# Patient Record
Sex: MALE | Race: WHITE | Employment: FULL TIME | URBAN - METROPOLITAN AREA
[De-identification: names, ages, dates, MRNs, and addresses within clinical notes are randomized per-mention and may not be internally consistent; named-entity substitution may affect disease eponyms.]

---

## 2021-08-26 ENCOUNTER — APPOINTMENT (OUTPATIENT)
Dept: CT IMAGING | Facility: CLINIC | Age: 66
DRG: 897 | End: 2021-08-26
Payer: COMMERCIAL

## 2021-08-26 ENCOUNTER — HOSPITAL ENCOUNTER (INPATIENT)
Age: 66
LOS: 5 days | Discharge: HOME | DRG: 897 | End: 2021-08-31
Attending: EMERGENCY MEDICINE | Admitting: INTERNAL MEDICINE
Payer: COMMERCIAL

## 2021-08-26 DIAGNOSIS — I10 ESSENTIAL HYPERTENSION: ICD-10-CM

## 2021-08-26 DIAGNOSIS — F10.10 ALCOHOL ABUSE: ICD-10-CM

## 2021-08-26 DIAGNOSIS — R55 VASOVAGAL RESPONSE: ICD-10-CM

## 2021-08-26 DIAGNOSIS — S09.90XA CLOSED HEAD INJURY, INITIAL ENCOUNTER: Primary | ICD-10-CM

## 2021-08-26 DIAGNOSIS — R56.9 SEIZURE (HCC): ICD-10-CM

## 2021-08-26 DIAGNOSIS — R56.9 SEIZURES (HCC): ICD-10-CM

## 2021-08-26 PROBLEM — Z72.0 TOBACCO ABUSE: Status: ACTIVE | Noted: 2021-08-26

## 2021-08-26 LAB
-: ABNORMAL
ABSOLUTE EOS #: 0.1 K/UL (ref 0–0.4)
ABSOLUTE IMMATURE GRANULOCYTE: ABNORMAL K/UL (ref 0–0.3)
ABSOLUTE LYMPH #: 1.5 K/UL (ref 1–4.8)
ABSOLUTE MONO #: 0.7 K/UL (ref 0.1–1.2)
ALBUMIN SERPL-MCNC: 3.9 G/DL (ref 3.5–5.2)
ALBUMIN/GLOBULIN RATIO: 1.6 (ref 1–2.5)
ALP BLD-CCNC: 87 U/L (ref 40–129)
ALT SERPL-CCNC: 29 U/L (ref 5–41)
AMORPHOUS: ABNORMAL
AMPHETAMINE SCREEN URINE: NEGATIVE
ANION GAP SERPL CALCULATED.3IONS-SCNC: 18 MMOL/L (ref 9–17)
AST SERPL-CCNC: 62 U/L
BACTERIA: ABNORMAL
BARBITURATE SCREEN URINE: NEGATIVE
BASOPHILS # BLD: 1 % (ref 0–2)
BASOPHILS ABSOLUTE: 0.1 K/UL (ref 0–0.2)
BENZODIAZEPINE SCREEN, URINE: NEGATIVE
BILIRUB SERPL-MCNC: 0.6 MG/DL (ref 0.3–1.2)
BILIRUBIN URINE: NEGATIVE
BUN BLDV-MCNC: 10 MG/DL (ref 8–23)
BUN/CREAT BLD: ABNORMAL (ref 9–20)
BUPRENORPHINE URINE: NORMAL
CALCIUM SERPL-MCNC: 8.8 MG/DL (ref 8.6–10.4)
CANNABINOID SCREEN URINE: NEGATIVE
CASTS UA: ABNORMAL /LPF (ref 0–2)
CHLORIDE BLD-SCNC: 100 MMOL/L (ref 98–107)
CO2: 19 MMOL/L (ref 20–31)
COCAINE METABOLITE, URINE: NEGATIVE
COLOR: YELLOW
COMMENT UA: ABNORMAL
CREAT SERPL-MCNC: 0.8 MG/DL (ref 0.7–1.2)
CRYSTALS, UA: ABNORMAL /HPF
DIFFERENTIAL TYPE: ABNORMAL
EOSINOPHILS RELATIVE PERCENT: 2 % (ref 1–4)
EPITHELIAL CELLS UA: ABNORMAL /HPF (ref 0–5)
ETHANOL PERCENT: <0.01 %
ETHANOL: <10 MG/DL
GFR AFRICAN AMERICAN: >60 ML/MIN
GFR NON-AFRICAN AMERICAN: >60 ML/MIN
GFR SERPL CREATININE-BSD FRML MDRD: ABNORMAL ML/MIN/{1.73_M2}
GFR SERPL CREATININE-BSD FRML MDRD: ABNORMAL ML/MIN/{1.73_M2}
GLUCOSE BLD-MCNC: 126 MG/DL (ref 70–99)
GLUCOSE BLD-MCNC: 134 MG/DL (ref 75–110)
GLUCOSE URINE: NEGATIVE
HCT VFR BLD CALC: 42.3 % (ref 41–53)
HEMOGLOBIN: 14.5 G/DL (ref 13.5–17.5)
IMMATURE GRANULOCYTES: ABNORMAL %
KETONES, URINE: ABNORMAL
LEUKOCYTE ESTERASE, URINE: NEGATIVE
LYMPHOCYTES # BLD: 17 % (ref 24–44)
MAGNESIUM: 1.6 MG/DL (ref 1.6–2.6)
MCH RBC QN AUTO: 34.1 PG (ref 26–34)
MCHC RBC AUTO-ENTMCNC: 34.4 G/DL (ref 31–37)
MCV RBC AUTO: 99.1 FL (ref 80–100)
MDMA URINE: NORMAL
METHADONE SCREEN, URINE: NEGATIVE
METHAMPHETAMINE, URINE: NORMAL
MONOCYTES # BLD: 8 % (ref 2–11)
MUCUS: ABNORMAL
NITRITE, URINE: NEGATIVE
NRBC AUTOMATED: ABNORMAL PER 100 WBC
OPIATES, URINE: NEGATIVE
OTHER OBSERVATIONS UA: ABNORMAL
OXYCODONE SCREEN URINE: NEGATIVE
PDW BLD-RTO: 14.5 % (ref 12.5–15.4)
PH UA: 7 (ref 5–8)
PHENCYCLIDINE, URINE: NEGATIVE
PLATELET # BLD: 223 K/UL (ref 140–450)
PLATELET ESTIMATE: ABNORMAL
PMV BLD AUTO: 9.1 FL (ref 6–12)
POTASSIUM SERPL-SCNC: 3.7 MMOL/L (ref 3.7–5.3)
PROPOXYPHENE, URINE: NORMAL
PROTEIN UA: NEGATIVE
RBC # BLD: 4.27 M/UL (ref 4.5–5.9)
RBC # BLD: ABNORMAL 10*6/UL
RBC UA: ABNORMAL /HPF (ref 0–2)
RENAL EPITHELIAL, UA: ABNORMAL /HPF
SEG NEUTROPHILS: 72 % (ref 36–66)
SEGMENTED NEUTROPHILS ABSOLUTE COUNT: 6.7 K/UL (ref 1.8–7.7)
SODIUM BLD-SCNC: 137 MMOL/L (ref 135–144)
SPECIFIC GRAVITY UA: 1.02 (ref 1–1.03)
TEST INFORMATION: NORMAL
TOTAL PROTEIN: 6.4 G/DL (ref 6.4–8.3)
TRICHOMONAS: ABNORMAL
TRICYCLIC ANTIDEPRESSANTS, UR: NORMAL
TROPONIN INTERP: NORMAL
TROPONIN INTERP: NORMAL
TROPONIN T: NORMAL NG/ML
TROPONIN T: NORMAL NG/ML
TROPONIN, HIGH SENSITIVITY: <6 NG/L (ref 0–22)
TROPONIN, HIGH SENSITIVITY: <6 NG/L (ref 0–22)
TURBIDITY: CLEAR
URINE HGB: ABNORMAL
UROBILINOGEN, URINE: NORMAL
WBC # BLD: 9.2 K/UL (ref 3.5–11)
WBC # BLD: ABNORMAL 10*3/UL
WBC UA: ABNORMAL /HPF (ref 0–5)
YEAST: ABNORMAL

## 2021-08-26 PROCEDURE — 80307 DRUG TEST PRSMV CHEM ANLYZR: CPT

## 2021-08-26 PROCEDURE — 2580000003 HC RX 258: Performed by: EMERGENCY MEDICINE

## 2021-08-26 PROCEDURE — 84484 ASSAY OF TROPONIN QUANT: CPT

## 2021-08-26 PROCEDURE — 6370000000 HC RX 637 (ALT 250 FOR IP): Performed by: NURSE PRACTITIONER

## 2021-08-26 PROCEDURE — 85025 COMPLETE CBC W/AUTO DIFF WBC: CPT

## 2021-08-26 PROCEDURE — 93005 ELECTROCARDIOGRAM TRACING: CPT | Performed by: EMERGENCY MEDICINE

## 2021-08-26 PROCEDURE — 6370000000 HC RX 637 (ALT 250 FOR IP): Performed by: EMERGENCY MEDICINE

## 2021-08-26 PROCEDURE — 81001 URINALYSIS AUTO W/SCOPE: CPT

## 2021-08-26 PROCEDURE — 99285 EMERGENCY DEPT VISIT HI MDM: CPT

## 2021-08-26 PROCEDURE — 99222 1ST HOSP IP/OBS MODERATE 55: CPT | Performed by: INTERNAL MEDICINE

## 2021-08-26 PROCEDURE — 80053 COMPREHEN METABOLIC PANEL: CPT

## 2021-08-26 PROCEDURE — 70450 CT HEAD/BRAIN W/O DYE: CPT

## 2021-08-26 PROCEDURE — 82947 ASSAY GLUCOSE BLOOD QUANT: CPT

## 2021-08-26 PROCEDURE — 6360000002 HC RX W HCPCS: Performed by: EMERGENCY MEDICINE

## 2021-08-26 PROCEDURE — 36415 COLL VENOUS BLD VENIPUNCTURE: CPT

## 2021-08-26 PROCEDURE — 96374 THER/PROPH/DIAG INJ IV PUSH: CPT

## 2021-08-26 PROCEDURE — 83735 ASSAY OF MAGNESIUM: CPT

## 2021-08-26 PROCEDURE — G0480 DRUG TEST DEF 1-7 CLASSES: HCPCS

## 2021-08-26 PROCEDURE — 72125 CT NECK SPINE W/O DYE: CPT

## 2021-08-26 PROCEDURE — 6360000002 HC RX W HCPCS

## 2021-08-26 PROCEDURE — 2060000000 HC ICU INTERMEDIATE R&B

## 2021-08-26 RX ORDER — LORAZEPAM 2 MG/ML
1 INJECTION INTRAMUSCULAR ONCE
Status: COMPLETED | OUTPATIENT
Start: 2021-08-26 | End: 2021-08-26

## 2021-08-26 RX ORDER — FOLIC ACID 1 MG/1
1 TABLET ORAL DAILY
Status: DISCONTINUED | OUTPATIENT
Start: 2021-08-26 | End: 2021-08-31 | Stop reason: HOSPADM

## 2021-08-26 RX ORDER — DIAZEPAM 5 MG/1
5 TABLET ORAL ONCE
Status: COMPLETED | OUTPATIENT
Start: 2021-08-26 | End: 2021-08-26

## 2021-08-26 RX ORDER — BUPROPION HYDROCHLORIDE 300 MG/1
300 TABLET ORAL EVERY MORNING
Status: ON HOLD | COMMUNITY
End: 2021-08-27

## 2021-08-26 RX ORDER — LORAZEPAM 2 MG/ML
2 INJECTION INTRAMUSCULAR EVERY 4 HOURS PRN
Status: DISCONTINUED | OUTPATIENT
Start: 2021-08-26 | End: 2021-08-27

## 2021-08-26 RX ORDER — ACETAMINOPHEN 325 MG/1
650 TABLET ORAL EVERY 4 HOURS PRN
Status: DISCONTINUED | OUTPATIENT
Start: 2021-08-26 | End: 2021-08-31 | Stop reason: HOSPADM

## 2021-08-26 RX ORDER — TAMSULOSIN HYDROCHLORIDE 0.4 MG/1
0.4 CAPSULE ORAL DAILY
Status: DISCONTINUED | OUTPATIENT
Start: 2021-08-27 | End: 2021-08-31 | Stop reason: HOSPADM

## 2021-08-26 RX ORDER — CARVEDILOL 3.12 MG/1
3.12 TABLET ORAL 2 TIMES DAILY WITH MEALS
Status: ON HOLD | COMMUNITY
End: 2021-08-31 | Stop reason: SDUPTHER

## 2021-08-26 RX ORDER — LORAZEPAM 2 MG/ML
INJECTION INTRAMUSCULAR
Status: COMPLETED
Start: 2021-08-26 | End: 2021-08-26

## 2021-08-26 RX ORDER — TAMSULOSIN HYDROCHLORIDE 0.4 MG/1
0.4 CAPSULE ORAL DAILY
COMMUNITY

## 2021-08-26 RX ORDER — 0.9 % SODIUM CHLORIDE 0.9 %
1000 INTRAVENOUS SOLUTION INTRAVENOUS ONCE
Status: COMPLETED | OUTPATIENT
Start: 2021-08-26 | End: 2021-08-26

## 2021-08-26 RX ORDER — GAUZE BANDAGE 2" X 2"
100 BANDAGE TOPICAL DAILY
Status: DISCONTINUED | OUTPATIENT
Start: 2021-08-26 | End: 2021-08-31 | Stop reason: HOSPADM

## 2021-08-26 RX ORDER — AMLODIPINE BESYLATE 10 MG/1
10 TABLET ORAL DAILY
Status: ON HOLD | COMMUNITY
End: 2021-08-31 | Stop reason: SDUPTHER

## 2021-08-26 RX ORDER — HYDROCHLOROTHIAZIDE 25 MG/1
25 TABLET ORAL DAILY
Status: ON HOLD | COMMUNITY
End: 2021-08-31 | Stop reason: SDUPTHER

## 2021-08-26 RX ADMIN — LORAZEPAM 1 MG: 2 INJECTION INTRAMUSCULAR; INTRAVENOUS at 15:57

## 2021-08-26 RX ADMIN — LORAZEPAM 1 MG: 2 INJECTION INTRAMUSCULAR at 12:08

## 2021-08-26 RX ADMIN — FOLIC ACID 1 MG: 1 TABLET ORAL at 21:18

## 2021-08-26 RX ADMIN — ACETAMINOPHEN 650 MG: 325 TABLET ORAL at 21:18

## 2021-08-26 RX ADMIN — LORAZEPAM 1 MG: 2 INJECTION INTRAMUSCULAR; INTRAVENOUS at 12:08

## 2021-08-26 RX ADMIN — Medication 100 MG: at 21:18

## 2021-08-26 RX ADMIN — SODIUM CHLORIDE 1000 ML: 9 INJECTION, SOLUTION INTRAVENOUS at 09:55

## 2021-08-26 RX ADMIN — DIAZEPAM 5 MG: 5 TABLET ORAL at 17:45

## 2021-08-26 ASSESSMENT — ENCOUNTER SYMPTOMS
VOMITING: 0
SHORTNESS OF BREATH: 0
SORE THROAT: 0
DIARRHEA: 0

## 2021-08-26 ASSESSMENT — PAIN SCALES - GENERAL
PAINLEVEL_OUTOF10: 3
PAINLEVEL_OUTOF10: 5

## 2021-08-26 NOTE — ED TRIAGE NOTES
Pt presents to ER via LS 5 with fall/ seizure. Per wife at bedside, states co-worker/ heard a \"thud\" and came outside, pt was on the ground seizing. Bystander did not witness the fall. Pt was post-ictal upon EMS arrival . Blood sugar -97 EKG on scene LBBB. PER LS 5 REPORT. PT C SPINE cleared upon EMS arrival. Pt answers questions, pt bit his tongue and has an abrasion to back of head. Pt exhibits tremors. Pt admits to drinking last night ETOH.

## 2021-08-26 NOTE — ED NOTES
[facility] CALLED- NO AVAILABLE TRANSPORT UNTIL TOMORROW.  [address], MILLA  08/26/21 0699

## 2021-08-26 NOTE — PROGRESS NOTES
Admitted from ER to room 1009-2. Pt alert and oriented. Pt oriented to call light system and agreeable to call for assistance. Family at bed side. Admission documentation completed    Pt is current smoker.

## 2021-08-26 NOTE — ED NOTES
[facility] board.  Calling [facility] to check status     Anushka Gomez RN  08/26/21 4434

## 2021-08-26 NOTE — LETTER
[facility]  [address] [id number]  Phone: [phone]             August 31, 2021    Patient: Hakeem Elizalde   Date of Birth:    Date of Visit: 8/26/2021       To Whom It May Concern:    Artie Aguilar was seen and treated in our facility beginning 8/26/2021 until 8/31/2021. Per Dr. Santos Ours Blood he may not return to work until he follows up with his PCP due to the nature of his treatment.  If you have any questions or concerns please feel free to talk to the charge nurse of the unit [phone]      Sincerely,       Víctor San RN-BSN         Signature:__________________________________

## 2021-08-26 NOTE — ED NOTES
Pt assisted to bathroom via wheelchair / extremity tremors more profound per wife.       Sloan Greenfield RN  08/26/21 6014

## 2021-08-26 NOTE — ED NOTES
This RN to bedside to discuss discharge instructions with patient and patient significant other. Patient then had an approximately 60 second tonic-clonic seizure. Pt was sitting on edge of bed, no fall occurred. Pt remained on ED cot for event. Pt was turned on his side, Dr. Nir Knight and Kings Kelley EMT-P to bedside to assist.   Verbal order obtained at this time from Dr. Nir Knight for IV Ativan. Airway remained patent, pt did bite his tongue but no respiratory distress noted. Pt came to, is post-ictal, amnesic to events. Dr. Nir Knight and pt significant other agree to admit patient for further evaluation.        Aniceto Ken, MILLA  08/26/21 5895 Mary Bernard, RN  08/26/21 0748

## 2021-08-26 NOTE — ED NOTES
[facility] DISPATCHER ETA [address].       Kaylan Rivera RN  08/26/21 8177 [facility], RN  08/26/21 2007

## 2021-08-26 NOTE — H&P
[facility]  Office: [address], DO, Stacy Harrison, DO, Qasim Tim, DO, Inna Paty Gama, DO, Surya Montgomery MD, Sade Negrete MD, Leslie Quiroga MD, Anne Tucker MD, Beryle Marseille, MD, Ciro Pallas, MD, Jose D Noriega MD, [facility], DO, Diane Hunt MD, Nelsy Barber, DO, Joey Carranza MD,  Alex Young DO, Jordana Patton MD, Apolonia Lorenzo MD, Jie Roman MD, Mahsa Benson MD, Chris Hall MD, Vika Cosby MD, Angeles Merrill MD, Zamzam Lacey, [facility], [address], [facility], Mao Carlin, CNP, Nickolas Berry, CNS, Hardik Xavier, CNP, Betsy Simon, CNP, Shirin Nagel, CNP, Dane Torrez, CNP, Osman Hart, CNP, NADJA Tan-C, Mortimer Guardian, [facility], Favio Samuels, CNP, Lavonne Frankel, CNP, Christopher Marks, CNP, Shelli Casarez, CNP, Live Schroeder, CNP, Prince Aponte, CNP, Gabi Haley, CNP, Keny Raygoza, [facility]    HISTORY AND PHYSICAL EXAMINATION            Date:   8/26/2021  Patient name:  Jennifer Castaneda  Date of admission:  8/26/2021  9:37 AM  MRN:   [id number]  Account:  [de-identified]  YOB: 1955  PCP:    Liudmila Portillo MD  Room:   [facility]9/1009-02  Code Status:    No Order    Chief Complaint:     Chief Complaint   Patient presents with    Seizures     witnessed fall onto the grass via co-workers. Pt fell and struck back of head- abrasion to scalp. Pt LS 5  cleared c spine on scene pt denies neck pain        History Obtained From:     patient, spouse, electronic medical record    History of Present Illness:     Jennifer Castaneda is a 63 y.o.  male who presents with Seizures (witnessed fall onto the grass via co-workers. Pt fell and struck back of head- abrasion to scalp. Pt LS 5  cleared c spine on scene pt denies neck pain )   and is admitted to the hospital for the management of New onset seizure ([facility] [address] This 63 yom presents after having a seizure.   He was at work at time, was heard to have grunted, then fall, tried to get up and fell again. He had no aura and has never had this before. In ER, he was seen to have an approx 1 minute seizure witnessed by staff. His last alcoholic drink was yesterday, per wife in last 6 weeks he went from drinking on weekends to daily 1-2 pints of vodka/day    Past Medical History:     Past Medical History:   Diagnosis Date    Hypertension     Prostate cancer ([facility] [address]     PER WIFE- SUSPECTED     Smoker         Past Surgical History:     Past Surgical History:   Procedure Laterality Date    JOINT REPLACEMENT      RIGHT HIP REPLACEMENT     SHOULDER ARTHROSCOPY          Medications Prior to Admission:     Prior to Admission medications    Medication Sig Start Date End Date Taking? Authorizing Provider   tamsulosin (FLOMAX) 0.4 MG capsule Take 0.4 mg by mouth daily   Yes Historical Provider, MD   buPROPion (WELLBUTRIN XL) 300 MG extended release tablet Take 300 mg by mouth every morning   Yes Historical Provider, MD   amLODIPine (NORVASC) 10 MG tablet Take 10 mg by mouth daily   Yes Historical Provider, MD   carvedilol (COREG) 3.125 MG tablet Take 3.125 mg by mouth 2 times daily (with meals)   Yes Historical Provider, MD   hydroCHLOROthiazide (HYDRODIURIL) 25 MG tablet Take 25 mg by mouth daily   Yes Historical Provider, MD        Allergies:     Dilaudid [hydromorphone hcl], Morphine, Singulair [montelukast], and Sulfa antibiotics    Social History:     Tobacco:    reports that he has been smoking. He has been smoking about 1.00 pack per day. He has never used smokeless tobacco.  Alcohol:      reports current alcohol use. Drug Use:  reports no history of drug use. Family History:     Family History   Problem Relation Age of Onset    Colon Cancer Maternal Cousin     Colon Cancer Maternal Aunt        Review of Systems:     Positive and Negative as described in HPI.     CONSTITUTIONAL:  negative for fevers, chills, sweats, fatigue, weight loss  HEENT:  negative for vision, hearing changes, runny nose, throat pain  RESPIRATORY:  negative for shortness of breath, cough, congestion, wheezing  CARDIOVASCULAR:  negative for chest pain, palpitations  GASTROINTESTINAL:  negative for nausea, vomiting, diarrhea, constipation, change in bowel habits, abdominal pain   GENITOURINARY:  negative for difficulty of urination, burning with urination, frequency   INTEGUMENT:  negative for rash, skin lesions, easy bruising   HEMATOLOGIC/LYMPHATIC:  negative for swelling/edema   ALLERGIC/IMMUNOLOGIC:  negative for urticaria , itching  ENDOCRINE:  negative increase in drinking, increase in urination, hot or cold intolerance  MUSCULOSKELETAL:  negative joint pains, muscle aches, swelling of joints  NEUROLOGICAL:  negative for headaches, dizziness, lightheadedness, numbness, pain, tingling extremities  BEHAVIOR/PSYCH:  negative for depression, anxiety    Physical Exam:   BP (!) 156/89   Pulse 95   Temp 99.9 °F (37.7 °C) (Oral)   Resp 18   Ht 5' 10\" (1.778 m)   Wt 216 lb (98 kg)   SpO2 98%   BMI 30.99 kg/m²   Temp (24hrs), Av.5 °F (37.5 °C), Min:99 °F (37.2 °C), Max:99.9 °F (37.7 °C)    Recent Labs     21  1306   POCGLU 134*       Intake/Output Summary (Last 24 hours) at 2021 1842  Last data filed at 2021 1057  Gross per 24 hour   Intake 1000 ml   Output --   Net 1000 ml       General Appearance:  alert, well appearing, and in no acute distress  Mental status: oriented to person, place, and time  Head:  normocephalic, atraumatic; evidence of rhinophyma  Eye: no icterus, redness, pupils equal and reactive, extraocular eye movements intact, conjunctiva clear  Ear: normal external ear, no discharge, hearing intact  Nose:  no drainage noted  Mouth: mucous membranes moist  Neck: supple, no carotid bruits, thyroid not palpable  Lungs: Bilateral equal air entry, clear to auscultation, no wheezing, rales or rhonchi, normal effort  Cardiovascular: normal rate, regular rhythm, no murmur, gallop, rub.   Abdomen: Soft, nontender, nondistended, normal bowel sounds, no hepatomegaly or splenomegaly  Neurologic: his finger to nose testing reveals dysmetria, rest of neuro exam unremarkable  Skin: No gross lesions, rashes, bruising or bleeding on exposed skin area  Extremities:  peripheral pulses palpable, no pedal edema or calf pain with palpation  Psych: normal affect     Investigations:      Laboratory Testing:  Recent Results (from the past 24 hour(s))   CBC Auto Differential    Collection Time: 08/26/21  9:40 AM   Result Value Ref Range    WBC 9.2 3.5 - 11.0 k/uL    RBC 4.27 (L) 4.5 - 5.9 m/uL    Hemoglobin 14.5 13.5 - 17.5 g/dL    Hematocrit 42.3 41 - 53 %    MCV 99.1 80 - 100 fL    MCH 34.1 (H) 26 - 34 pg    MCHC 34.4 31 - 37 g/dL    RDW 14.5 12.5 - 15.4 %    Platelets 896 436 - 630 k/uL    MPV 9.1 6.0 - 12.0 fL    NRBC Automated NOT REPORTED per 100 WBC    Differential Type NOT REPORTED     Immature Granulocytes NOT REPORTED 0 %    Absolute Immature Granulocyte NOT REPORTED 0.00 - 0.30 k/uL    WBC Morphology NOT REPORTED     RBC Morphology NOT REPORTED     Platelet Estimate NOT REPORTED     Seg Neutrophils 72 (H) 36 - 66 %    Lymphocytes 17 (L) 24 - 44 %    Monocytes 8 2 - 11 %    Eosinophils % 2 1 - 4 %    Basophils 1 0 - 2 %    Segs Absolute 6.70 1.8 - 7.7 k/uL    Absolute Lymph # 1.50 1.0 - 4.8 k/uL    Absolute Mono # 0.70 0.1 - 1.2 k/uL    Absolute Eos # 0.10 0.0 - 0.4 k/uL    Basophils Absolute 0.10 0.0 - 0.2 k/uL   Comprehensive Metabolic Panel    Collection Time: 08/26/21  9:40 AM   Result Value Ref Range    Glucose 126 (H) 70 - 99 mg/dL    BUN 10 8 - 23 mg/dL    CREATININE 0.80 0.70 - 1.20 mg/dL    Bun/Cre Ratio NOT REPORTED 9 - 20    Calcium 8.8 8.6 - 10.4 mg/dL    Sodium 137 135 - 144 mmol/L    Potassium 3.7 3.7 - 5.3 mmol/L    Chloride 100 98 - 107 mmol/L    CO2 19 (L) 20 - 31 mmol/L    Anion Gap 18 (H) 9 - 17 mmol/L    Alkaline Phosphatase 87 40 - 129 U/L ALT 29 5 - 41 U/L    AST 62 (H) <40 U/L    Total Bilirubin 0.60 0.3 - 1.2 mg/dL    Total Protein 6.4 6.4 - 8.3 g/dL    Albumin 3.9 3.5 - 5.2 g/dL    Albumin/Globulin Ratio 1.6 1.0 - 2.5    GFR Non-African American >60 >60 mL/min    GFR African American >60 >60 mL/min    GFR Comment          GFR Staging NOT REPORTED    Magnesium    Collection Time: 08/26/21  9:40 AM   Result Value Ref Range    Magnesium 1.6 1.6 - 2.6 mg/dL   Troponin    Collection Time: 08/26/21  9:40 AM   Result Value Ref Range    Troponin, High Sensitivity <6 0 - 22 ng/L    Troponin T NOT REPORTED <0.03 ng/mL    Troponin Interp NOT REPORTED    Ethanol    Collection Time: 08/26/21  9:40 AM   Result Value Ref Range    Ethanol <10 <10 mg/dL    Ethanol percent <0.010 <0.010 %   EKG 12 Lead    Collection Time: 08/26/21  9:46 AM   Result Value Ref Range    Ventricular Rate 96 BPM    Atrial Rate 96 BPM    P-R Interval 178 ms    QRS Duration 158 ms    Q-T Interval 418 ms    QTc Calculation (Bazett) 528 ms    P Axis 54 degrees    R Axis 46 degrees    T Axis -141 degrees   Troponin    Collection Time: 08/26/21 11:34 AM   Result Value Ref Range    Troponin, High Sensitivity <6 0 - 22 ng/L    Troponin T NOT REPORTED <0.03 ng/mL    Troponin Interp NOT REPORTED    POC Glucose Fingerstick    Collection Time: 08/26/21  1:06 PM   Result Value Ref Range    POC Glucose 134 (H) 75 - 110 mg/dL   Urinalysis Reflex to Culture    Collection Time: 08/26/21  1:09 PM    Specimen: Urine, clean catch   Result Value Ref Range    Color, UA YELLOW YELLOW    Turbidity UA CLEAR CLEAR    Glucose, Ur NEGATIVE NEGATIVE    Bilirubin Urine NEGATIVE NEGATIVE    Ketones, Urine SMALL (A) NEGATIVE    Specific Gravity, UA 1.025 1.005 - 1.030    Urine Hgb TRACE (A) NEGATIVE    pH, UA 7.0 5.0 - 8.0    Protein, UA NEGATIVE NEGATIVE    Urobilinogen, Urine Normal Normal    Nitrite, Urine NEGATIVE NEGATIVE    Leukocyte Esterase, Urine NEGATIVE NEGATIVE    Urinalysis Comments NOT REPORTED Urine Drug Screen    Collection Time: 08/26/21  1:09 PM   Result Value Ref Range    Amphetamine Screen, Ur NEGATIVE NEGATIVE    Barbiturate Screen, Ur NEGATIVE NEGATIVE    Benzodiazepine Screen, Urine NEGATIVE NEGATIVE    Cocaine Metabolite, Urine NEGATIVE NEGATIVE    Methadone Screen, Urine NEGATIVE NEGATIVE    Opiates, Urine NEGATIVE NEGATIVE    Phencyclidine, Urine NEGATIVE NEGATIVE    Propoxyphene, Urine NOT REPORTED NEGATIVE    Cannabinoid Scrn, Ur NEGATIVE NEGATIVE    Oxycodone Screen, Ur NEGATIVE NEGATIVE    Methamphetamine, Urine NOT REPORTED NEGATIVE    Tricyclic Antidepressants, Urine NOT REPORTED NEGATIVE    MDMA, Urine NOT REPORTED NEGATIVE    Buprenorphine Urine NOT REPORTED NEGATIVE    Test Information       Assay provides medical screening only. The absence of expected drug(s) and/or metabolite(s) may indicate diluted or adulterated urine, limitations of testing or timing of collection. Microscopic Urinalysis    Collection Time: 08/26/21  1:09 PM   Result Value Ref Range    -          WBC, UA 0 TO 2 0 - 5 /HPF    RBC, UA 2 TO 5 0 - 2 /HPF    Casts UA NOT REPORTED 0 - 2 /LPF    Crystals, UA NOT REPORTED None /HPF    Epithelial Cells UA 0 TO 2 0 - 5 /HPF    Renal Epithelial, UA NOT REPORTED 0 /HPF    Bacteria, UA NOT REPORTED None    Mucus, UA NOT REPORTED None    Trichomonas, UA NOT REPORTED None    Amorphous, UA NOT REPORTED None    Other Observations UA (A) NOT REQ. Utilizing a urinalysis as the only screening method to exclude a potential uropathogen can be unreliable in many patient populations. Rapid screening tests are less sensitive than culture and if UTI is a clinical possibility, culture should be considered despite a negative urinalysis. Yeast, UA FEW (A) None       Imaging/Diagnostics:  CT HEAD WO CONTRAST    Result Date: 8/26/2021  No acute CT abnormality identified in the brain. No acute osseous abnormality identified in the cervical spine.      CT CERVICAL SPINE WO CONTRAST    Result Date: 8/26/2021  No acute CT abnormality identified in the brain. No acute osseous abnormality identified in the cervical spine. Assessment :      Hospital Problems         Last Modified POA    * (Principal) New onset seizure ([facility] [address] 8/26/2021 Yes    Seizures ([facility] [address] 8/26/2021 Yes    Tobacco abuse 8/26/2021 Yes    Alcohol abuse 8/26/2021 Yes    Essential hypertension 8/26/2021 Yes          Plan:     Patient status inpatient in the Progressive Unit/Step down    1. Neuro eval  2. Mri brain  3. eeg  4. Monitor for seizures  5. Alcohol withdrawal meds  6. bp control  7. Alcohol cessation  8. Smoking cessation    Consultations:   IP CONSULT TO NEUROLOGY  IP CONSULT TO SOCIAL WORK     Patient is admitted as inpatient status because of co-morbidities listed above, severity of signs and symptoms as outlined, requirement for current medical therapies and most importantly because of direct risk to patient if care not provided in a hospital setting. Expected length of stay > 48 hours.     [facility] Blood, DO  8/26/2021  6:42 PM    Copy sent to Dr. Santino Mckenna MD

## 2021-08-26 NOTE — ED PROVIDER NOTES
[facility]  [facility]  Phone: [id number] [phone] ED  EMERGENCY DEPARTMENT ENCOUNTER      Pt Name: Sánchez White  MRN: [id number]  Bhargavgfurt 1955  Date of evaluation: 8/26/2021  Provider: Aiden Bertrand DO    CHIEF COMPLAINT       Chief Complaint   Patient presents with    Seizures     witnessed fall onto the grass via co-workers. Pt fell and struck back of head- abrasion to scalp. Pt LS 5  cleared c spine on scene pt denies neck pain          HISTORY OF PRESENT ILLNESS   (Location/Symptom, Timing/Onset,Context/Setting, Quality, Duration, Modifying Factors, Severity)  Note limiting factors. Sánchez White is a 63 y.o. male who presents to the emergency department for the evaluation of a syncopal episode this morning. It sounds like a coworker had heard a yelp almost like he slipped or fell, she then heard a thud on the ground, she then went to check on him and saw him sort of in a doorway having what appeared to be a seizure. Patient himself states that he remembers everything about the incident with the exception of the fall. Denies seizures. Currently does have a history of some alcohol abuse and drink the last couple of days but he denies any alcohol or drug use today. He has no pain in his neck. No numbness, tingling or weakness in his arms or legs. He denies any chest pain or shortness of breath before or after the event. No recent travel. Patient denies any pain anywhere. Patient denies any chest pain    Nursing Notes were reviewed. REVIEW OF SYSTEMS    (2-9systems for level 4, 10 or more for level 5)     Review of Systems   Constitutional: Negative for fever. HENT: Negative for sore throat. Respiratory: Negative for shortness of breath. Cardiovascular: Negative for chest pain. Gastrointestinal: Negative for diarrhea and vomiting. Genitourinary: Negative for dysuria. Skin: Negative for rash.    Neurological: Negative for weakness. All other systems reviewed and are negative. Except asnoted above the remainder of the review of systems was reviewed and negative. PAST MEDICAL HISTORY     Past Medical History:   Diagnosis Date    Hypertension     Prostate cancer ([address]     PER WIFE- SUSPECTED     Smoker          SURGICAL HISTORY       Past Surgical History:   Procedure Laterality Date    JOINT REPLACEMENT      RIGHT HIP REPLACEMENT     SHOULDER ARTHROSCOPY           CURRENT MEDICATIONS     Previous Medications    AMLODIPINE (NORVASC) 10 MG TABLET    Take 10 mg by mouth daily    BUPROPION (WELLBUTRIN XL) 300 MG EXTENDED RELEASE TABLET    Take 300 mg by mouth every morning    CARVEDILOL (COREG) 3.125 MG TABLET    Take 3.125 mg by mouth 2 times daily (with meals)    HYDROCHLOROTHIAZIDE (HYDRODIURIL) 25 MG TABLET    Take 25 mg by mouth daily    TAMSULOSIN (FLOMAX) 0.4 MG CAPSULE    Take 0.4 mg by mouth daily       ALLERGIES     Dilaudid [hydromorphone hcl], Morphine, and Sulfa antibiotics    FAMILY HISTORY     History reviewed. No pertinent family history. SOCIAL HISTORY       Social History     Socioeconomic History    Marital status: Single     Spouse name: None    Number of children: None    Years of education: None    Highest education level: None   Occupational History    None   Tobacco Use    Smoking status: Current Every Day Smoker     Packs/day: 1.00    Smokeless tobacco: Never Used   Substance and Sexual Activity    Alcohol use: Yes     Comment: 2-3 WEEK     Drug use: Never    Sexual activity: None   Other Topics Concern    None   Social History Narrative    None     Social Determinants of Health     Financial Resource Strain:     Difficulty of Paying Living Expenses:    Food Insecurity:     Worried About Running Out of Food in the Last Year:     Ran Out of Food in the Last Year:    Transportation Needs:     Lack of Transportation (Medical):      Lack of Transportation (Non-Medical):    Physical Activity:     Days of Exercise per Week:     Minutes of Exercise per Session:    Stress:     Feeling of Stress :    Social Connections:     Frequency of Communication with Friends and Family:     Frequency of Social Gatherings with Friends and Family:     Attends Taoist Services:     Active Member of Clubs or Organizations:     Attends Club or Organization Meetings:     Marital Status:    Intimate Partner Violence:     Fear of Current or Ex-Partner:     Emotionally Abused:     Physically Abused:     Sexually Abused:        SCREENINGS    [place] Coma Scale  Eye Opening: Spontaneous  Best Verbal Response: Oriented  Best Motor Response: Obeys commands  Cici Coma Scale Score: 15        PHYSICAL EXAM    (up to 7 for level 4, 8 or more for level 5)     ED Triage Vitals   BP Temp Temp src Pulse Resp SpO2 Height Weight   -- -- -- -- -- -- -- --       Physical Exam  Vitals and nursing note reviewed. Constitutional:       General: He is not in acute distress. Appearance: Normal appearance. He is not ill-appearing or toxic-appearing. HENT:      Head: Normocephalic and atraumatic. Comments: No abebe sign, no raccoon eye, no scalp hematoma     Nose: Nose normal. No congestion. Mouth/Throat:      Mouth: Mucous membranes are moist.   Eyes:      General:         Right eye: No discharge. Left eye: No discharge. Conjunctiva/sclera: Conjunctivae normal.   Cardiovascular:      Rate and Rhythm: Normal rate and regular rhythm. Pulses: Normal pulses. Heart sounds: Normal heart sounds. No murmur heard. Pulmonary:      Effort: Pulmonary effort is normal. No respiratory distress. Breath sounds: Normal breath sounds. No wheezing. Abdominal:      General: Abdomen is flat. There is no distension. Palpations: Abdomen is soft. Tenderness: There is no abdominal tenderness. Musculoskeletal:         General: No deformity or signs of injury. Normal range of motion. Cervical back: Normal range of motion. Skin:     General: Skin is warm and dry. Capillary Refill: Capillary refill takes less than 2 seconds. Findings: No rash. Neurological:      General: No focal deficit present. Mental Status: He is alert and oriented to person, place, and time. Mental status is at baseline. Sensory: No sensory deficit. Motor: No weakness. Comments: Speaking normally. No facial asymmetry. Moving all 4 extremities. Normal gait. Psychiatric:         Mood and Affect: Mood normal.         EMERGENCY DEPARTMENT COURSE and DIFFERENTIAL DIAGNOSIS/MDM:   Vitals:    Vitals:    08/26/21 1032 08/26/21 1146 08/26/21 1213 08/26/21 1233   BP: (!) 152/82 (!) 149/98 (!) 160/85 (!) 144/88   Pulse: 98 102 110 105   Resp: 10 17 20 19   Temp:       SpO2: 99% 96% 98% 93%   Weight:       Height:           Patient presents to the emergency department with a complaint described above. He has no signs of any significant trauma. There is some concern that he was possibly drinking alcohol this morning even though he denies it. At this time, we will need to evaluate for syncopal event, possible head injury. Going to get CT of the head without contrast, twelve-lead EKG, routine blood work, cardiac enzymes, will get an alcohol level, I am going to give some IV fluids and then I will reevaluate      DIAGNOSTIC RESULTS     Twelve lead EKG interpreted by myself:  A 12 lead EKG done at 0946, interpreted by myself, showed a regular rhythm at a rate of 96bpm.  The NH interval was normal.  The QRS complex was abnormal consistent with a left bundle branch block. No modified scar Bosa criteria for acute MI.  QRS progression through precordial leads was abnormal.  Interpretation: Left bundle branch block noted.   No previous EKG with which to compare, however, EMS noted that patient has history of left bundle, will confirm with family    LABS:  Labs Reviewed   CBC WITH AUTO DIFFERENTIAL - Abnormal; Notable for the following components:       Result Value    RBC 4.27 (*)     MCH 34.1 (*)     Seg Neutrophils 72 (*)     Lymphocytes 17 (*)     All other components within normal limits   COMPREHENSIVE METABOLIC PANEL - Abnormal; Notable for the following components:    Glucose 126 (*)     CO2 19 (*)     Anion Gap 18 (*)     AST 62 (*)     All other components within normal limits   MAGNESIUM   TROPONIN   ETHANOL   TROPONIN       All other labs were within normal range or not returned as of this dictation. RADIOLOGY:  CT HEAD WO CONTRAST   Final Result   No acute CT abnormality identified in the brain. No acute osseous abnormality identified in the cervical spine. CT CERVICAL SPINE WO CONTRAST   Final Result   No acute CT abnormality identified in the brain. No acute osseous abnormality identified in the cervical spine. ED Course as of Aug 26 1252   Thu Aug 26, 2021   1048 CT of head and brain are unremarkable    [TS]   1048 Initial troponin negative. CBC normal.    [TS]   1054 Alcohol level is negative. CMP shows slight increase in anion gap and slight decrease in CO2, he is getting fluid hydrate and I will reevaluate    [TS]   1117 On reevaluation, patient is significantly improved. He states that he did not have a seizure. He states that he had a mechanical fall, he hit his head, he tried to get up too fast after that happened and might of passed out. He has no pain in his head. No chest pain, no shortness of breath and no history of seizures. Patient is feeling fine. They can confirm he has a history of a left bundle branch block, this is not new    [TS]   1158 Patient second troponin is also completely negative.   After further discussion with the patient, after work-up in the emergency department, I believe that he tripped and fell, believe that he hit his head but did not lose consciousness, I believe that he tried to get back up and had a vasovagal episode and I do believe at this time he has returned to his baseline and can follow-up with his primary care physician. Went to discharge the patient and he started having another seizure. At this time, I certainly do think that there is an alcohol component to the seizures. Definitely drinks more than he lets on, considering he most likely had a seizure earlier, now had another seizure, I do think he will need to be admitted for further evaluation and treatment    [TS]   [phone] with Rosalinda DIOP at [facility] at [phone] and she is accepting on behalf of Dr. Gama. [TS]      ED Course User Index  [TS] Tre Crawford DO         PROCEDURES:  Unless otherwise noted below, none     Procedures    FINAL IMPRESSION      1. Closed head injury, initial encounter    2. Vasovagal response    3. Seizure ([address]    4. Alcohol abuse          DISPOSITION/PLAN   DISPOSITION Decision To Admit 08/26/2021 12:17:27 PM      PATIENT REFERRED TO:  Your primary doctor  If you do not have a primary care physician or you are looking for a new physician, please contact the following number [phone] to establish one.           DISCHARGE MEDICATIONS:  New Prescriptions    No medications on file          (Please note that portions of this note were completed with a voice recognition program.  Efforts were made to edit the dictations but occasionally words are mis-transcribed.)    Tre Crawford DO (electronically signed)  Board Certified Emergency Physician          Tre Crawford DO  08/26/21 [address] [place] [address], DO  08/26/21 1217       Tre Crawford DO  08/26/21 [address] [place] [address], DO  08/26/21 1252

## 2021-08-27 ENCOUNTER — APPOINTMENT (OUTPATIENT)
Dept: MRI IMAGING | Age: 66
DRG: 897 | End: 2021-08-27
Payer: COMMERCIAL

## 2021-08-27 LAB
ANION GAP SERPL CALCULATED.3IONS-SCNC: 12 MMOL/L (ref 9–17)
BUN BLDV-MCNC: 5 MG/DL (ref 8–23)
BUN/CREAT BLD: 10 (ref 9–20)
CALCIUM SERPL-MCNC: 8.2 MG/DL (ref 8.6–10.4)
CHLORIDE BLD-SCNC: 100 MMOL/L (ref 98–107)
CO2: 25 MMOL/L (ref 20–31)
CREAT SERPL-MCNC: 0.49 MG/DL (ref 0.7–1.2)
EKG ATRIAL RATE: 96 BPM
EKG P AXIS: 54 DEGREES
EKG P-R INTERVAL: 178 MS
EKG Q-T INTERVAL: 418 MS
EKG QRS DURATION: 158 MS
EKG QTC CALCULATION (BAZETT): 528 MS
EKG R AXIS: 46 DEGREES
EKG T AXIS: -141 DEGREES
EKG VENTRICULAR RATE: 96 BPM
GFR AFRICAN AMERICAN: >60 ML/MIN
GFR NON-AFRICAN AMERICAN: >60 ML/MIN
GFR SERPL CREATININE-BSD FRML MDRD: ABNORMAL ML/MIN/{1.73_M2}
GFR SERPL CREATININE-BSD FRML MDRD: ABNORMAL ML/MIN/{1.73_M2}
GLUCOSE BLD-MCNC: 93 MG/DL (ref 70–99)
HCT VFR BLD CALC: 39.8 % (ref 40.7–50.3)
HEMOGLOBIN: 13.2 G/DL (ref 13–17)
MAGNESIUM: 1.6 MG/DL (ref 1.6–2.6)
MCH RBC QN AUTO: 32.8 PG (ref 25.2–33.5)
MCHC RBC AUTO-ENTMCNC: 33.2 G/DL (ref 28.4–34.8)
MCV RBC AUTO: 99 FL (ref 82.6–102.9)
NRBC AUTOMATED: 0 PER 100 WBC
PDW BLD-RTO: 14.5 % (ref 11.8–14.4)
PLATELET # BLD: 180 K/UL (ref 138–453)
PMV BLD AUTO: 10.3 FL (ref 8.1–13.5)
POTASSIUM SERPL-SCNC: 2.8 MMOL/L (ref 3.7–5.3)
POTASSIUM SERPL-SCNC: 3.2 MMOL/L (ref 3.7–5.3)
RBC # BLD: 4.02 M/UL (ref 4.21–5.77)
SODIUM BLD-SCNC: 137 MMOL/L (ref 135–144)
WBC # BLD: 7.1 K/UL (ref 3.5–11.3)

## 2021-08-27 PROCEDURE — 84132 ASSAY OF SERUM POTASSIUM: CPT

## 2021-08-27 PROCEDURE — 97535 SELF CARE MNGMENT TRAINING: CPT

## 2021-08-27 PROCEDURE — 6360000002 HC RX W HCPCS: Performed by: PSYCHIATRY & NEUROLOGY

## 2021-08-27 PROCEDURE — 2580000003 HC RX 258: Performed by: PSYCHIATRY & NEUROLOGY

## 2021-08-27 PROCEDURE — 97162 PT EVAL MOD COMPLEX 30 MIN: CPT

## 2021-08-27 PROCEDURE — 6360000002 HC RX W HCPCS: Performed by: INTERNAL MEDICINE

## 2021-08-27 PROCEDURE — 80048 BASIC METABOLIC PNL TOTAL CA: CPT

## 2021-08-27 PROCEDURE — 6370000000 HC RX 637 (ALT 250 FOR IP): Performed by: INTERNAL MEDICINE

## 2021-08-27 PROCEDURE — 6360000004 HC RX CONTRAST MEDICATION: Performed by: PSYCHIATRY & NEUROLOGY

## 2021-08-27 PROCEDURE — 6360000002 HC RX W HCPCS: Performed by: NURSE PRACTITIONER

## 2021-08-27 PROCEDURE — 36415 COLL VENOUS BLD VENIPUNCTURE: CPT

## 2021-08-27 PROCEDURE — 6370000000 HC RX 637 (ALT 250 FOR IP): Performed by: NURSE PRACTITIONER

## 2021-08-27 PROCEDURE — A9579 GAD-BASE MR CONTRAST NOS,1ML: HCPCS | Performed by: PSYCHIATRY & NEUROLOGY

## 2021-08-27 PROCEDURE — 97530 THERAPEUTIC ACTIVITIES: CPT

## 2021-08-27 PROCEDURE — 83735 ASSAY OF MAGNESIUM: CPT

## 2021-08-27 PROCEDURE — 97166 OT EVAL MOD COMPLEX 45 MIN: CPT

## 2021-08-27 PROCEDURE — 99232 SBSQ HOSP IP/OBS MODERATE 35: CPT | Performed by: INTERNAL MEDICINE

## 2021-08-27 PROCEDURE — 70553 MRI BRAIN STEM W/O & W/DYE: CPT

## 2021-08-27 PROCEDURE — 99222 1ST HOSP IP/OBS MODERATE 55: CPT | Performed by: PSYCHIATRY & NEUROLOGY

## 2021-08-27 PROCEDURE — 97116 GAIT TRAINING THERAPY: CPT

## 2021-08-27 PROCEDURE — 85027 COMPLETE CBC AUTOMATED: CPT

## 2021-08-27 PROCEDURE — 2060000000 HC ICU INTERMEDIATE R&B

## 2021-08-27 RX ORDER — LORAZEPAM 2 MG/ML
2 INJECTION INTRAMUSCULAR
Status: DISCONTINUED | OUTPATIENT
Start: 2021-08-27 | End: 2021-08-29

## 2021-08-27 RX ORDER — AMLODIPINE BESYLATE 10 MG/1
10 TABLET ORAL DAILY
Status: DISCONTINUED | OUTPATIENT
Start: 2021-08-27 | End: 2021-08-31 | Stop reason: HOSPADM

## 2021-08-27 RX ORDER — HYDROCHLOROTHIAZIDE 25 MG/1
25 TABLET ORAL DAILY
Status: DISCONTINUED | OUTPATIENT
Start: 2021-08-27 | End: 2021-08-31 | Stop reason: HOSPADM

## 2021-08-27 RX ORDER — POTASSIUM CHLORIDE 7.45 MG/ML
10 INJECTION INTRAVENOUS PRN
Status: DISCONTINUED | OUTPATIENT
Start: 2021-08-27 | End: 2021-08-28

## 2021-08-27 RX ORDER — BUPROPION HYDROCHLORIDE 150 MG/1
150 TABLET, EXTENDED RELEASE ORAL 2 TIMES DAILY
Status: ON HOLD | COMMUNITY
End: 2021-08-31 | Stop reason: HOSPADM

## 2021-08-27 RX ORDER — HYDRALAZINE HYDROCHLORIDE 20 MG/ML
10 INJECTION INTRAMUSCULAR; INTRAVENOUS EVERY 6 HOURS PRN
Status: DISCONTINUED | OUTPATIENT
Start: 2021-08-27 | End: 2021-08-31 | Stop reason: HOSPADM

## 2021-08-27 RX ORDER — MAGNESIUM SULFATE 1 G/100ML
1000 INJECTION INTRAVENOUS ONCE
Status: COMPLETED | OUTPATIENT
Start: 2021-08-27 | End: 2021-08-27

## 2021-08-27 RX ORDER — CARVEDILOL 3.12 MG/1
3.12 TABLET ORAL 2 TIMES DAILY WITH MEALS
Status: DISCONTINUED | OUTPATIENT
Start: 2021-08-27 | End: 2021-08-31 | Stop reason: HOSPADM

## 2021-08-27 RX ORDER — SODIUM CHLORIDE 0.9 % (FLUSH) 0.9 %
10 SYRINGE (ML) INJECTION 2 TIMES DAILY
Status: DISCONTINUED | OUTPATIENT
Start: 2021-08-27 | End: 2021-08-29 | Stop reason: SDUPTHER

## 2021-08-27 RX ADMIN — GADOTERIDOL 20 ML: 279.3 INJECTION, SOLUTION INTRAVENOUS at 08:43

## 2021-08-27 RX ADMIN — SODIUM CHLORIDE, PRESERVATIVE FREE 10 ML: 5 INJECTION INTRAVENOUS at 19:39

## 2021-08-27 RX ADMIN — POTASSIUM CHLORIDE 10 MEQ: 10 INJECTION, SOLUTION INTRAVENOUS at 13:37

## 2021-08-27 RX ADMIN — LORAZEPAM 2 MG: 2 INJECTION INTRAMUSCULAR; INTRAVENOUS at 23:33

## 2021-08-27 RX ADMIN — MAGNESIUM GLUCONATE 500 MG ORAL TABLET 400 MG: 500 TABLET ORAL at 19:39

## 2021-08-27 RX ADMIN — POTASSIUM CHLORIDE 10 MEQ: 10 INJECTION, SOLUTION INTRAVENOUS at 06:56

## 2021-08-27 RX ADMIN — CARVEDILOL 3.12 MG: 3.12 TABLET, FILM COATED ORAL at 17:07

## 2021-08-27 RX ADMIN — POTASSIUM CHLORIDE 10 MEQ: 10 INJECTION, SOLUTION INTRAVENOUS at 09:38

## 2021-08-27 RX ADMIN — LORAZEPAM 2 MG: 2 INJECTION INTRAMUSCULAR; INTRAVENOUS at 14:20

## 2021-08-27 RX ADMIN — POTASSIUM CHLORIDE 10 MEQ: 10 INJECTION, SOLUTION INTRAVENOUS at 19:58

## 2021-08-27 RX ADMIN — LORAZEPAM 2 MG: 2 INJECTION INTRAMUSCULAR; INTRAVENOUS at 17:39

## 2021-08-27 RX ADMIN — HYDROCHLOROTHIAZIDE 25 MG: 25 TABLET ORAL at 14:08

## 2021-08-27 RX ADMIN — MAGNESIUM SULFATE HEPTAHYDRATE 1000 MG: 1 INJECTION, SOLUTION INTRAVENOUS at 12:09

## 2021-08-27 RX ADMIN — LORAZEPAM 2 MG: 2 INJECTION INTRAMUSCULAR; INTRAVENOUS at 19:39

## 2021-08-27 RX ADMIN — Medication 100 MG: at 09:38

## 2021-08-27 RX ADMIN — LORAZEPAM 2 MG: 2 INJECTION INTRAMUSCULAR; INTRAVENOUS at 09:54

## 2021-08-27 RX ADMIN — LORAZEPAM 2 MG: 2 INJECTION INTRAMUSCULAR; INTRAVENOUS at 21:40

## 2021-08-27 RX ADMIN — AMLODIPINE BESYLATE 10 MG: 10 TABLET ORAL at 14:08

## 2021-08-27 RX ADMIN — POTASSIUM CHLORIDE 10 MEQ: 10 INJECTION, SOLUTION INTRAVENOUS at 15:44

## 2021-08-27 RX ADMIN — TAMSULOSIN HYDROCHLORIDE 0.4 MG: 0.4 CAPSULE ORAL at 09:38

## 2021-08-27 RX ADMIN — POTASSIUM CHLORIDE 10 MEQ: 10 INJECTION, SOLUTION INTRAVENOUS at 17:49

## 2021-08-27 RX ADMIN — MAGNESIUM GLUCONATE 500 MG ORAL TABLET 400 MG: 500 TABLET ORAL at 14:09

## 2021-08-27 RX ADMIN — FOLIC ACID 1 MG: 1 TABLET ORAL at 09:38

## 2021-08-27 ASSESSMENT — PAIN SCALES - GENERAL
PAINLEVEL_OUTOF10: 0

## 2021-08-27 NOTE — PROGRESS NOTES
[facility]  Office: [address], DO, Celio De Souza, DO, Buchtelkita Manning, DO, Karma Duncan Blood, DO, Jake Georges MD, Morena Doty MD, Shiva Hutchison MD, Jenny Cuellar MD, Nori Castaneda MD, Juanjose Baker MD, Aracely Villegas MD, Mark Fine, DO, Nyoka Hodgkins, MD, Aniceto Bliss DO, Jadene Boast, MD,  Liza Hogan, DO, Bhavesh Peralta MD, Tc Romero MD, Nieves Tang MD, Shira Hess MD, Patricia Mendoza MD, Westley Hill MD, Adriane Paul MD, Krystal Gallo CNP, [facility], CNP, Diego Collier, CNP, Saúl Fritz, CNS, Keisha Rear, CNP, Melanie Mason, CNP, Jacob Foot, CNP, Tonya Gamma, CNP, Mackenzie Loser, CNP, Janet Boss, PA-C, Michelle Cutler, DM, Lam Gauthier, CNP, Kita Stanford, CNP, Amara Bah, CNP, Jorje Hansen, CNP, Valorie Alexander, CNP, Angely Kingsley, CNP, Yennifer Mitchell, CNP, Chad Kumar, [facility]    Progress Note    8/27/2021    8:10 AM    Name:   Jordana Sawyer  MRN:     [id number]     Acct:      [de-identified]   Room:   [address] [place] [address] Day:  1  Admit Date:  8/26/2021  9:37 AM    PCP:   Cherry Fisher MD  Code Status:  No Order    Subjective:     C/C:   Chief Complaint   Patient presents with    Seizures     witnessed fall onto the grass via co-workers. Pt fell and struck back of head- abrasion to scalp. Pt LS 5  cleared c spine on scene pt denies neck pain      Interval History Status: improved. No further seizures  Denies cp/sob/n/v    Brief History:     Jordana Sawyer is a 63 y.o.  male who presents with Seizures (witnessed fall onto the grass via co-workers. Pt fell and struck back of head- abrasion to scalp. Pt LS 5  cleared c spine on scene pt denies neck pain )   and is admitted to the hospital for the management of New onset seizure ([address]    This 63 yom presents after having a seizure.   He was at work at time, was heard to have grunted, then fall, tried to get up and fell again. He had no aura and has never had this before. In ER, he was seen to have an approx 1 minute seizure witnessed by staff. His last alcoholic drink was yesterday, per wife in last 6 weeks he went from drinking on weekends to daily 1-2 pints of vodka/day    Review of Systems:     Constitutional:  negative for chills, fevers, sweats  Respiratory:  negative for cough, dyspnea on exertion, shortness of breath, wheezing  Cardiovascular:  negative for chest pain, chest pressure/discomfort, lower extremity edema, palpitations  Gastrointestinal:  negative for abdominal pain, constipation, diarrhea, nausea, vomiting  Neurological:  negative for dizziness, headache    Medications: Allergies: Allergies   Allergen Reactions    Dilaudid [Hydromorphone Hcl]     Morphine     Singulair [Montelukast] Hives    Sulfa Antibiotics        Current Meds:   Scheduled Meds:    sodium chloride flush  10 mL IntraVENous BID    magnesium sulfate  1,000 mg IntraVENous Once    thiamine mononitrate  100 mg Oral Daily    folic acid  1 mg Oral Daily    tamsulosin  0.4 mg Oral Daily     Continuous Infusions:   PRN Meds: potassium chloride, gadoteridol, LORazepam, acetaminophen    Data:     Past Medical History:   has a past medical history of Hypertension, Prostate cancer ([address] and Smoker. Social History:   reports that he has been smoking. He has been smoking about 1.00 pack per day. He has never used smokeless tobacco. He reports current alcohol use. He reports that he does not use drugs.      Family History:   Family History   Problem Relation Age of Onset    Colon Cancer Maternal Cousin     Colon Cancer Maternal Aunt        Vitals:  BP (!) 152/93   Pulse 79   Temp 98.8 °F (37.1 °C) (Oral)   Resp 16   Ht 5' 10\" (1.778 m)   Wt 219 lb 7 oz (99.5 kg)   SpO2 98%   BMI 31.49 kg/m²   Temp (24hrs), Av.7 °F (37.1 °C), Min:97.7 °F (36.5 °C), Max:99.9 °F (37.7 °C)    Recent Labs     21  1306 POCGLU 134*       I/O (24Hr): Intake/Output Summary (Last 24 hours) at 8/27/2021 0810  Last data filed at 8/27/2021 0800  Gross per 24 hour   Intake 1000 ml   Output 350 ml   Net 650 ml       Labs:  Hematology:  Recent Labs     08/26/21  0940 08/27/21  0515   WBC 9.2 7.1   RBC 4.27* 4.02*   HGB 14.5 13.2   HCT 42.3 39.8*   MCV 99.1 99.0   MCH 34.1* 32.8   MCHC 34.4 33.2   RDW 14.5 14.5*    180   MPV 9.1 10.3     Chemistry:  Recent Labs     08/26/21  0940 08/26/21  1134 08/27/21  0515     --  137   K 3.7  --  2.8*     --  100   CO2 19*  --  25   GLUCOSE 126*  --  93   BUN 10  --  5*   CREATININE 0.80  --  0.49*   MG 1.6  --   --    ANIONGAP 18*  --  12   LABGLOM >60  --  >60   GFRAA >60  --  >60   CALCIUM 8.8  --  8.2*   TROPHS <6 <6  --      Recent Labs     08/26/21  0940 08/26/21  1306   PROT 6.4  --    LABALBU 3.9  --    AST 62*  --    ALT 29  --    ALKPHOS 87  --    BILITOT 0.60  --    POCGLU  --  134*     ABG:No results found for: POCPH, PHART, PH, POCPCO2, THC2QAL, PCO2, POCPO2, PO2ART, PO2, POCHCO3, WHQ9ZAK, HCO3, NBEA, PBEA, BEART, BE, THGBART, THB, LXY8MMK, SBLV1XIE, V9JBIXZB, O2SAT, FIO2  No results found for: SPECIAL  No results found for: CULTURE    Radiology:  CT HEAD WO CONTRAST    Result Date: 8/26/2021  No acute CT abnormality identified in the brain. No acute osseous abnormality identified in the cervical spine. CT CERVICAL SPINE WO CONTRAST    Result Date: 8/26/2021  No acute CT abnormality identified in the brain. No acute osseous abnormality identified in the cervical spine.        Physical Examination:        General appearance:  alert, cooperative and no distress  Mental Status:  oriented to person, place and time and normal affect  Lungs:  clear to auscultation bilaterally, normal effort  Heart:  regular rate and rhythm, no murmur  Abdomen:  soft, nontender, nondistended, normal bowel sounds, no masses, hepatomegaly, splenomegaly  Extremities:  no edema, redness, tenderness in the calves  Skin:  no gross lesions, rashes, induration  Still has some dysmetria    Assessment:        Hospital Problems         Last Modified POA    * (Principal) New onset seizure ([address] 8/26/2021 Yes    Seizures ([address] 8/26/2021 Yes    Tobacco abuse 8/26/2021 Yes    Alcohol abuse 8/26/2021 Yes    Essential hypertension 8/26/2021 Yes          Plan:        1. D/w neuro, cancel eeg as this is likely alcohol induced  2. Mri brain today  3. Replace mg, k  4. Takes wellbutrin at home: keep off of this due to seizures  5. Monitor for further seizures  6.  Resume other home meds    Jenny Gama, DO  8/27/2021  8:10 AM

## 2021-08-27 NOTE — ACP (ADVANCE CARE PLANNING)
Advance Care Planning     Advance Care Planning Activator (Inpatient)  Conversation Note      Date of ACP Conversation: 8/27/2021     Conversation Conducted with: Patient with Decision Making Capacity    ACP Activator: Pebbles Eduardo RN        Health Care Decision Maker:     Current Designated Health Care Decision Maker:     Click here to complete Healthcare Decision Makers including section of the Healthcare Decision Maker Relationship (ie \"Primary\")  Today we documented Decision Maker(s) consistent with Legal Next of Kin hierarchy. Care Preferences    Ventilation: \"If you were in your present state of health and suddenly became very ill and were unable to breathe on your own, what would your preference be about the use of a ventilator (breathing machine) if it were available to you? \"      Would the patient desire the use of ventilator (breathing machine)?: yes    \"If your health worsens and it becomes clear that your chance of recovery is unlikely, what would your preference be about the use of a ventilator (breathing machine) if it were available to you? \"     Would the patient desire the use of ventilator (breathing machine)?: Yes      Resuscitation  \"CPR works best to restart the heart when there is a sudden event, like a heart attack, in someone who is otherwise healthy. Unfortunately, CPR does not typically restart the heart for people who have serious health conditions or who are very sick. \"    \"In the event your heart stopped as a result of an underlying serious health condition, would you want attempts to be made to restart your heart (answer \"yes\" for attempt to resuscitate) or would you prefer a natural death (answer \"no\" for do not attempt to resuscitate)? \" yes       [x] Yes   [] No   Educated Ana M / Jesús Carr regarding differences between Advance Directives and portable DNR orders.     Length of ACP Conversation in minutes:      Conversation Outcomes:  [x] ACP discussion completed  [] Existing advance directive reviewed with patient; no changes to patient's previously recorded wishes  [] New Advance Directive completed  [] Portable Do Not Rescitate prepared for Provider review and signature  [] POLST/POST/MOLST/MOST prepared for Provider review and signature      Follow-up plan:    [] Schedule follow-up conversation to continue planning  [] Referred individual to Provider for additional questions/concerns   [] Advised patient/agent/surrogate to review completed ACP document and update if needed with changes in condition, patient preferences or care setting    [x] This note routed to one or more involved healthcare providers

## 2021-08-27 NOTE — CONSULTS
[facility] Neurology   IN-PATIENT SERVICE      NEUROLOGY CONSULT  NOTE            Date:   8/27/2021  Patient name:  Gaby Benavides  Date of admission:  8/26/2021  YOB: 1955      Chief Complaint:     Chief Complaint   Patient presents with    Seizures     witnessed fall onto the grass via co-workers. Pt fell and struck back of head- abrasion to scalp. Pt LS 5  cleared c spine on scene pt denies neck pain        Reason for Consult:    New onset of seizure. Alcohol withdrawal.      History of Present Illness: The patient is a 63 y.o. male who presents with Seizures (witnessed fall onto the grass via co-workers. Pt fell and struck back of head- abrasion to scalp. Pt LS 5  cleared c spine on scene pt denies neck pain )  . The patient was seen and examined and the chart was reviewed. Patient asked me to discuss his case with his wife by telephone. I did that in his presence. I also discussed this case with Dr. Gama. Patient admits to drinking 2 pints to 1/5 of vodka per day. He has done this for an extended period of time. His wife confirms this. Patient has had 1 episode of alcohol withdrawal approximately 20 years ago. Patient's current hospitalization is related to a loss of consciousness and onset of seizure yesterday. He was at work. He apparently fell down and struck his right side bruising his right arm and right flank. He also bumped his head. He attempted to get up and fell down again. He presented to the emergency room for evaluation and [place]. CT of head and neck was negative for intracranial hemorrhage or fractures. Reports her chart and opportunity review those reports. Patient indicates that discontinued his alcohol intake the day before this event. He has no prior history of seizure activity. 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     I told him and his wife that he should not drive until we are certain that he will not have recurrent blackout episodes or seizures. Past Medical History:     Past Medical History:   Diagnosis Date    Hypertension     Prostate cancer ([facility] [address]     PER WIFE- SUSPECTED     Smoker         Past Surgical History:     Past Surgical History:   Procedure Laterality Date    JOINT REPLACEMENT      RIGHT HIP REPLACEMENT     SHOULDER ARTHROSCOPY          Medications Prior to Admission:     Prior to Admission medications    Medication Sig Start Date End Date Taking? Authorizing Provider   tamsulosin (FLOMAX) 0.4 MG capsule Take 0.4 mg by mouth daily   Yes Historical Provider, MD   buPROPion (WELLBUTRIN XL) 300 MG extended release tablet Take 300 mg by mouth every morning   Yes Historical Provider, MD   amLODIPine (NORVASC) 10 MG tablet Take 10 mg by mouth daily   Yes Historical Provider, MD   carvedilol (COREG) 3.125 MG tablet Take 3.125 mg by mouth 2 times daily (with meals)   Yes Historical Provider, MD   hydroCHLOROthiazide (HYDRODIURIL) 25 MG tablet Take 25 mg by mouth daily   Yes Historical Provider, MD        Allergies:     Dilaudid [hydromorphone hcl], Morphine, Singulair [montelukast], and Sulfa antibiotics    Social History:     Tobacco:    reports that he has been smoking. He has been smoking about 1.00 pack per day. He has never used smokeless tobacco.  Alcohol:      reports current alcohol use. Drug Use:  reports no history of drug use. Family History:     Family History   Problem Relation Age of Onset    Colon Cancer Maternal Cousin     Colon Cancer Maternal Aunt        Review of Systems:       Constitutional Negative for fever and chills   HEENT Negative for ear discharge, ear pain, nosebleed. Negative for photophobia, headache. Patient has known hearing impairment. Musculoskeletal Negative for joint pain, negative for myalgia   Respiratory Negative for cough, dyspnea. Negative for hemoptysis and sputum. Cardiovascular Negative for palpitations, chest pain.  Negative for orthopnea, claudication. Gastrointestinal Negative for nausea, vomiting. Negative for abdominal pain, diarrhea, blood in stool   Genitourinary  Negative for dysuria, hematuria. Negative for suprapubic pain. Negative for bladder incontinence. Skin Negative for rash or itching   Hematology Negative for ecchymosis, anemia   Psychiatric Negative for anxiety, depression. Negative for suicidal ideation, hallucinations         Physical Exam:   BP (!) 152/93   Pulse 79   Temp 98.8 °F (37.1 °C) (Oral)   Resp 16   Ht 5' 10\" (1.778 m)   Wt 219 lb 7 oz (99.5 kg)   SpO2 98%   BMI 31.49 kg/m²   Temp (24hrs), Av.7 °F (37.1 °C), Min:97.7 °F (36.5 °C), Max:99.9 °F (37.7 °C)    General examination:      General Appearance:  alert, well appearing, and in no acute distress  HEENT: Patient has a tender area on the back of his head where he fell  Neck: supple  Extremities: Bruises over right shoulder and right elbow. Bruise over right flank. Psych: Anxious      Neurological examination:    Mental status   Alert and oriented x 3; following all commands; speech is fluent, no dysarthria, aphasia. Memory testing is 1/3. Patient is anxious. He has fidgeting of both hands with tremulousness. Cranial nerves   II - visual fields intact to confrontation; pupils reactive. Pupil 4 mm  III, IV, VI - extraocular muscles intact; no nystagmus; no ptosis   V - normal facial sensation                                                               VII - normal facial symmetry                                                             VIII - impaired hearing. Not wearing hearing aids.                                  IX, X - symmetrical palate elevation                                               XI - symmetrical shoulder shrug                                                       XII - midline tongue without atrophy or fasciculation     Motor function  Strength: 5/5 RUE, 5/5 RLE, 5/5 LUE, 5/5  LLE no focal weakness noted.  Normal bulk and tone. Sensory function Intact to touch, pin, vibration throughout     Cerebellar  mild intention tremor finger-nose-finger testing. Resting tremor high-frequency low amplitude. Reflex function  trace/4 symmetric at biceps absent at knees absent at ankles. Downgoing plantar response bilaterally. Gait                   Romberg testing shows that he is unsteady and unsafe with eyes closed. Diagnostics:      Laboratory Testing:  CBC:   Recent Labs     08/26/21  0940 08/27/21  0515   WBC 9.2 7.1   HGB 14.5 13.2    180     BMP:    Recent Labs     08/26/21  0940 08/27/21  0515    137   K 3.7 2.8*    100   CO2 19* 25   BUN 10 5*   CREATININE 0.80 0.49*   GLUCOSE 126* 93         Lab Results   Component Value Date    ALT 29 08/26/2021    AST 62 (H) 08/26/2021     I personally reviewed all of the above medications, clinical laboratory, imaging and other diagnostic tests. Impression:      1. Seizure activity onset yesterday. No prior history of seizure disorder. Patient does have significant history of alcohol intake particularly over the past 6 weeks. This appears to be a provoked seizure at this point. 2. Significant alcohol intake. Plan:   1. I have ordered magnesium sulfate 1 g to be given IV today. 2.  Patient is advised to not drive until he receives further clearance. His wife also given this advice. 3.  Observation monitoring at this time is indicated. This appears to be a provoked seizure due to alcohol. 4.  We will not initiate anticonvulsant therapy at this time as I think that the patient is not likely to comply and that use of anticonvulsants and noncompliant patient may represent additional risk. Thank you for this very interesting consultation. Electronically signed by Niranjan Brice MD on 8/27/21 at 8:32 AM EDT evaluation at 7:50 AM to 8:20 AM.  .     Niranjan Brice MD  [facility]) Neuroscience [place]  Neurology

## 2021-08-27 NOTE — CARE COORDINATION
Case Management Initial Discharge Plan  Tracy Esteves,         Wilbert Risk              Risk of Unplanned Readmission:  8             Met with:patient to discuss discharge plans. Information verified: address, contacts, phone number, , insurance Yes  PCP: Michael Peralta MD  Date of last visit: last November     Insurance Provider: [address]     Discharge Planning  Current Residence:  Private home   Living Arrangements:  Spouse/Significant Other        Home has 1 stories/1 stairs to climb  Support Systems:  Family Members       Current Services PTA:  None   Agency: none      Patient able to perform ADL's:Independent  DME in home:  None   DME used to aid ambulation prior to admission:   None   DME used during admission:  None     Potential Assistance Needed:  N/A    Pharmacy: [facility]    Potential Assistance Purchasing Medications:  No  Does patient want to participate in local refill/ meds to beds program?  No    Patient agreeable to home care: No  [place] of choice provided:  n/a      Type of Home Care Services:  None  Patient expects to be discharged to:   home     Prior SNF/Rehab Placement and Facility: none   Agreeable to SNF/Rehab: No  [place] of choice provided: n/a   Evaluation: n/a    Expected Discharge date:  21  Follow Up Appointment: Best Day/ Time: Thursday AM    Transportation provider: per wife   Transportation arrangements needed for discharge: No    Discharge Plan:   Met with patient to complete a discharge assessment. Patient lives at home with wife. He works and drives. He Is admitted with seizure but questioned if related to alcohol use. Patient admits to drinking more in last 2-3 weeks. He consumes 1/5 at least daily. He has been thru AA in past but nothing currently. He is not interested on any inpatient resources but will accept any outpatient alcohol cessation resources. Notified ss and they will give patient resources.  No other needs at this time. Encourage him to follow up with his pcp.      Electronically signed by Erasmo Lewis RN on 8/27/21 at 11:53 AM EDT

## 2021-08-27 NOTE — PROGRESS NOTES
Diagnosis(es): The primary encounter diagnosis was Closed head injury, initial encounter. Diagnoses of Vasovagal response, Seizure ([address] and Alcohol abuse were also pertinent to this visit. has a past medical history of Hypertension, Prostate cancer ([facility] [address] and Smoker. has a past surgical history that includes joint replacement and Shoulder arthroscopy. Restrictions  Restrictions/Precautions  Restrictions/Precautions: General Precautions, Fall Risk, Up as Tolerated  Required Braces or Orthoses?: No  Position Activity Restriction  Other position/activity restrictions: LUE IV, seizure precautions, telemetry, telesitter  Vision/Hearing  Vision: Impaired (Pt reports he should have glasses but stopped wearing them a while ago. Pt reports that he has constant floaters in B eyes)  Hearing: Exceptions to [facility] Exceptions: Hard of hearing/hearing concerns; No hearing aid     Subjective  General  Chart Reviewed: Yes  Patient assessed for rehabilitation services?: Yes  Response To Previous Treatment: Patient with no complaints from previous session.   Family / Caregiver Present: No  General Comment  Comments: RN states patient apppropriate for therapy  Subjective  Subjective: patient sleeping soundly, difficult to arouse, once awake agreeable to treatment  Pain Screening  Patient Currently in Pain: No          Orientation  Orientation  Overall Orientation Status: Within Functional Limits  Social/Functional History  Social/Functional History  Lives With: Spouse  Type of Home: House  Home Layout: Multi-level (Split level 6 steps to bathroom)  Home Access: Stairs to enter without rails  Entrance Stairs - Number of Steps: 1  Bathroom Shower/Tub: Walk-in shower  Bathroom Toilet: Handicap height  ADL Assistance: Independent  Homemaking Assistance: Independent  Homemaking Responsibilities: Yes  Ambulation Assistance: Independent  Transfer Assistance: Independent  Active : Yes  Occupation: Full time employment  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[employer] [address]: Pt reports not really having hobbies  Additional Comments: Pt denies any recent falls besides one prior to admission  Cognition   Cognition  Overall Cognitive Status: Exceptions  Arousal/Alertness: Appropriate responses to stimuli  Following Commands: Follows multistep commands with repitition; Follows multistep commands with increased time  Attention Span: Appears intact  Memory: Decreased short term memory  Safety Judgement: Decreased awareness of need for assistance;Decreased awareness of need for safety  Problem Solving: Decreased awareness of errors;Assistance required to identify errors made;Assistance required to correct errors made  Insights: Decreased awareness of deficits  Initiation: Does not require cues  Sequencing: Does not require cues    Objective     Observation/Palpation  Posture: Good  Observation: UE tremors, L UE IV, pt states tongue hurts due to biting it when he fell, multiple abrasions on UE/LE, telesitter    AROM RLE (degrees)  RLE AROM: WFL  AROM LLE (degrees)  LLE AROM : WFL  AROM RUE (degrees)  RUE General AROM: refer to OT assessment  AROM LUE (degrees)  LUE General AROM: refer to OT assessment  Strength RLE  Comment: 4/5  Strength LLE  Comment: 4/5  Strength RUE  Comment: refer to OT assessment  Strength LUE  Comment: refer to OT assessment  Motor Control  Gross Motor?: WFL  Coordination  Heel to Shin: Normal     Bed mobility  Supine to Sit: Contact guard assistance  Sit to Supine: Stand by assistance  Comment: good use of bedrails  Transfers  Sit to Stand: Minimal Assistance  Stand to sit: Minimal Assistance  Comment: patient shaky and leaning posteriorly when first standing  Ambulation  Ambulation?: Yes  More Ambulation?: No  Ambulation 1  Surface: level tile  Device: No Device  Assistance: Minimal assistance  Quality of Gait: patient had UE tremors, occasional loss of balance,  Gait Deviations: Deviated path;Staggers  Distance: 36'  Comments: patient would benefit from use of roll walker due to loss of balance     Balance  Sitting - Static: Good  Sitting - Dynamic: Fair;+  Standing - Static: Fair  Standing - Dynamic: [address]  Times per week: 1-2x/day for 5-6 days/week  Specific instructions for Next Treatment: take roll walker for patient to use  Current Treatment Recommendations: Strengthening, Transfer Training, Endurance Training, Balance Training, Gait Training, Stair training, Home Exercise Program, Safety Education & Training, Neuromuscular Re-education, Cognitive Reorientation, Patient/Caregiver Education & Training  Safety Devices  Type of devices: All fall risk precautions in place, Gait belt, Nurse notified, Left in bed, Call light within reach, Bed alarm in place, Telesitter in use      AM-PAC Score  AM-PAC Inpatient Mobility Raw Score : 17 (08/27/21 1459)  AM-PAC Inpatient T-Scale Score : 42.13 (08/27/21 1459)  Mobility Inpatient CMS 0-100% Score: 50.57 (08/27/21 1459)  Mobility Inpatient CMS G-Code Modifier : CK (08/27/21 1459)          Goals  Short term goals  Time Frame for Short term goals: 12 visits  Short term goal 1: Independent bed mobility  Short term goal 2:  Independent sit<>stand  Short term goal 3: Patient to ambulate 100' with roll walker modified independent  Short term goal 4: Patient to perform 25 minutes ther act to facilitate improving dynamic standing balance       Therapy Time   Individual Concurrent Group Co-treatment   Time In 1149         Time Out 1203 (additional 10 minutes for chart review)         Minutes 14+10=24             Treatment time: 10 minutes    Felicia Ayala PT

## 2021-08-27 NOTE — PROGRESS NOTES
Occupational Therapy   Occupational Therapy Initial Assessment  Date: 2021   Patient Name: Nataliia Ordaz  MRN: [id number]     : 1955    Date of Service: 2021    MILLA Coughlin reports patient is medically stable for therapy treatment this date. Chart reviewed prior to treatment and patient is agreeable for therapy. All lines intact and patient positioned comfortably at end of treatment. All patient needs addressed prior to ending therapy session. Assessment   Performance deficits / Impairments: Decreased functional mobility ; Decreased safe awareness;Decreased cognition;Decreased ADL status; Decreased high-level IADLs;Decreased fine motor control;Decreased balance;Decreased coordination  Assessment: Pt would benefit from continued skilled OT services to increase I and safety during functional tasks to return home at prior level of function as able. Prognosis: Good  Decision Making: Medium Complexity  OT Education: Transfer Training;OT Role;Plan of Care;Energy Conservation; ADL Adaptive Strategies  Patient Education: safety in function, fall prevention/call light use, benefits of being oob, recommendations for continue therapy  REQUIRES OT FOLLOW UP: Yes  Activity Tolerance  Activity Tolerance: Patient Tolerated treatment well;Patient limited by fatigue  Activity Tolerance: fair -  Safety Devices  Safety Devices in place: Yes  Type of devices: Nurse notified;Gait belt;Bed alarm in place; Patient at risk for falls; Left in bed;Call light within reach; All fall risk precautions in place           Patient Diagnosis(es): The primary encounter diagnosis was Closed head injury, initial encounter. Diagnoses of Vasovagal response, Seizure ([address] and Alcohol abuse were also pertinent to this visit. has a past medical history of Hypertension, Prostate cancer ([address] and Smoker. has a past surgical history that includes joint replacement and Shoulder arthroscopy.      PER H&P: Nataliia Ordaz is a 63 y.o. male who presents to the emergency department for the evaluation of a syncopal episode this morning. It sounds like a coworker had heard a yelp almost like he slipped or fell, she then heard a thud on the ground, she then went to check on him and saw him sort of in a doorway having what appeared to be a seizure. Patient himself states that he remembers everything about the incident with the exception of the fall. Denies seizures. Currently does have a history of some alcohol abuse and drink the last couple of days but he denies any alcohol or drug use today. He has no pain in his neck. No numbness, tingling or weakness in his arms or legs. He denies any chest pain or shortness of breath before or after the event. No recent travel. Patient denies any pain anywhere.  Patient denies any chest pain      Restrictions  Restrictions/Precautions  Restrictions/Precautions: General Precautions, Fall Risk, Up as Tolerated  Required Braces or Orthoses?: No  Position Activity Restriction  Other position/activity restrictions: LUE IV, seizure precautions, telemetry, telesitter    Subjective   General  Chart Reviewed: Yes  Patient assessed for rehabilitation services?: Yes  Family / Caregiver Present: No  Subjective  Subjective: Pt up in bathroom upon writers entry, agreeable to OT Eval    Social/Functional History  Social/Functional History  Lives With: Spouse  Type of Home: House  Home Layout: Multi-level (Split level 6 steps to bathroom)  Home Access: Stairs to enter without rails  Entrance Stairs - Number of Steps: 1  Bathroom Shower/Tub: Walk-in shower  Bathroom Toilet: Handicap height  ADL Assistance: Independent  Homemaking Assistance: Independent  Homemaking Responsibilities: Yes  Ambulation Assistance: Independent  Transfer Assistance: Independent  Active : Yes  Occupation: Full time employment  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 Pt reports that he has constant floaters in B eyes)  Hearing: Exceptions to [facility]  Hearing Exceptions: Hard of hearing/hearing concerns; No hearing aid    Orientation  Overall Orientation Status: Within Functional Limits  Observation/Palpation  Posture: Good (No AD)  Observation: anykey (nursing reports this could be from withdrawls), LUE IV  Edema: none       Balance  Sitting Balance: Stand by assistance  Standing Balance: Contact guard assistance (pushing own IV pole)  Standing Balance  Time: standing darnell ~ 3-4 min  Activity: functional mobility  Functional Mobility  Functional - Mobility Device: Other (pushing own IV)  Assist Level: Contact guard assistance  Functional Mobility Comments: Pt required Min verbal cues for slowing down movements, scanning environment, awarness of lines and pacing self all to increase safety and reduce risk of falls. ADL  Feeding: Setup;Stand by assistance  Grooming: Setup;Stand by assistance (to wash hands standing at sink)  UE Bathing: Setup;Stand by assistance  LE Bathing: Setup;Contact guard assistance  UE Dressing: Setup;Minimal assistance (with hosp gown)  LE Dressing: Setup;Contact guard assistance (to adjust hosp socks, pt demo'd leaning outside of base of support)  Toileting: Stand by assistance       Tone RUE  RUE Tone: Normotonic  Tone LUE  LUE Tone: Normotonic  Coordination  Movements Are Fluid And Coordinated: No  Coordination and Movement description: Fine motor impairments;Tremors;Right UE;Decreased accuracy; Left UE;Decreased speed (Pt has constant tremors in B hands may be d/t withdrawls, slowed and delayed B opposition)          Bed mobility  Rolling to Right: Stand by assistance  Supine to Sit: Unable to assess (Pt up in bathroom at beginning of session)  Sit to Supine: Stand by assistance  Scooting: Stand by assistance  Comment: Pt with good use of bedrails, min verbal cues needed for awareness of lines. Transfers  Stand Step Transfers: Stand by assistance  Sit to stand: Stand by assistance  Stand to sit: Stand by assistance  Transfer Comments: Pt required Min verbal cues for slowing down movements, controlled stand to sit, reaching back prior to sitting and awareness of lines all  to increase safety and reduce risk of falls. Cognition  Overall Cognitive Status: Exceptions  Arousal/Alertness: Appropriate responses to stimuli  Following Commands: Follows multistep commands with repitition; Follows multistep commands with increased time  Attention Span: Appears intact  Memory: Decreased short term memory  Safety Judgement: Decreased awareness of need for assistance;Decreased awareness of need for safety  Problem Solving: Decreased awareness of errors;Assistance required to identify errors made;Assistance required to correct errors made  Insights: Decreased awareness of deficits  Initiation: Does not require cues  Sequencing: Does not require cues           Sensation  Overall Sensation Status: WFL        LUE AROM (degrees)  LUE AROM : WFL  RUE AROM (degrees)  RUE AROM : WFL  LUE Strength  Gross LUE Strength: WFL  LUE Strength Comment: 5/5  RUE Strength  Gross RUE Strength: WFL  RUE Strength Comment: 5/5                   Plan   Plan  Times per week: 4-5x/wk 1x/day as darnell  Current Treatment Recommendations: Balance Training, Functional Mobility Training, Safety Education & Training, Home Management Training, Self-Care / ADL, Equipment Evaluation, Education, & procurement, Neuromuscular Re-education, Patient/Caregiver Education & Training                          AM-PAC Score        AM-PAC Inpatient Daily Activity Raw Score: 19 (08/27/21 1317)  AM-PAC Inpatient ADL T-Scale Score : 40.22 (08/27/21 1317)  ADL Inpatient CMS 0-100% Score: 42.8 (08/27/21 1317)  ADL Inpatient CMS G-Code Modifier : CK (08/27/21 5587)      Goals  Short term goals  Time Frame for Short term goals: By discharge, pt to demo  Short term goal 1: ADL transfers and functional mobility to Mod I with use of AD as needed. Short term goal 2: UB/LB ADLs to Mod I with use of AD/AE as needed. Short term goal 3: toileting to Mod I with use of AD/grab bars as needed. Short term goal 4: I with fall prevention education and EC/WS tech with use of handouts as needed. Short term goal 5: bed mobility to Independent with use of bedrails as needed. Patient Goals   Patient goals : To go home!        Therapy Time   Individual Concurrent Group Co-treatment   Time In 5289         Time Out 0928         Minutes 39          tx time: 34 min        Russel Valentine, OT

## 2021-08-27 NOTE — PROGRESS NOTES
Patient given Ativan 2mg IVP x3 this shift for increased agitation, hand tremors and restlessness with effective results. Medication education provided to patient regarding side effects and side effect sign in room shown to patient as a reminder to call for assistance when he needs to get out of bed. Patient tolerated Ativan well and verbalized his understanding of medication side effects and need for assistance when getting out of bed. Will continue to monitor.

## 2021-08-28 PROBLEM — F10.939 ALCOHOL WITHDRAWAL (HCC): Status: ACTIVE | Noted: 2021-08-28

## 2021-08-28 LAB
ANION GAP SERPL CALCULATED.3IONS-SCNC: 9 MMOL/L (ref 9–17)
BUN BLDV-MCNC: 4 MG/DL (ref 8–23)
BUN/CREAT BLD: 8 (ref 9–20)
CALCIUM SERPL-MCNC: 9 MG/DL (ref 8.6–10.4)
CHLORIDE BLD-SCNC: 100 MMOL/L (ref 98–107)
CO2: 27 MMOL/L (ref 20–31)
CREAT SERPL-MCNC: 0.52 MG/DL (ref 0.7–1.2)
GFR AFRICAN AMERICAN: >60 ML/MIN
GFR NON-AFRICAN AMERICAN: >60 ML/MIN
GFR SERPL CREATININE-BSD FRML MDRD: ABNORMAL ML/MIN/{1.73_M2}
GFR SERPL CREATININE-BSD FRML MDRD: ABNORMAL ML/MIN/{1.73_M2}
GLUCOSE BLD-MCNC: 118 MG/DL (ref 70–99)
HCT VFR BLD CALC: 41.5 % (ref 40.7–50.3)
HEMOGLOBIN: 13.7 G/DL (ref 13–17)
MAGNESIUM: 1.8 MG/DL (ref 1.6–2.6)
MCH RBC QN AUTO: 33 PG (ref 25.2–33.5)
MCHC RBC AUTO-ENTMCNC: 33 G/DL (ref 28.4–34.8)
MCV RBC AUTO: 100 FL (ref 82.6–102.9)
NRBC AUTOMATED: 0 PER 100 WBC
PDW BLD-RTO: 14.3 % (ref 11.8–14.4)
PLATELET # BLD: 170 K/UL (ref 138–453)
PMV BLD AUTO: 10.4 FL (ref 8.1–13.5)
POTASSIUM SERPL-SCNC: 3.2 MMOL/L (ref 3.7–5.3)
RBC # BLD: 4.15 M/UL (ref 4.21–5.77)
SODIUM BLD-SCNC: 136 MMOL/L (ref 135–144)
WBC # BLD: 8.5 K/UL (ref 3.5–11.3)

## 2021-08-28 PROCEDURE — 6370000000 HC RX 637 (ALT 250 FOR IP): Performed by: NURSE PRACTITIONER

## 2021-08-28 PROCEDURE — 6360000002 HC RX W HCPCS: Performed by: INTERNAL MEDICINE

## 2021-08-28 PROCEDURE — 97116 GAIT TRAINING THERAPY: CPT

## 2021-08-28 PROCEDURE — 99232 SBSQ HOSP IP/OBS MODERATE 35: CPT | Performed by: INTERNAL MEDICINE

## 2021-08-28 PROCEDURE — 36415 COLL VENOUS BLD VENIPUNCTURE: CPT

## 2021-08-28 PROCEDURE — 85027 COMPLETE CBC AUTOMATED: CPT

## 2021-08-28 PROCEDURE — 6360000002 HC RX W HCPCS: Performed by: NURSE PRACTITIONER

## 2021-08-28 PROCEDURE — 83735 ASSAY OF MAGNESIUM: CPT

## 2021-08-28 PROCEDURE — 80048 BASIC METABOLIC PNL TOTAL CA: CPT

## 2021-08-28 PROCEDURE — 2580000003 HC RX 258: Performed by: PSYCHIATRY & NEUROLOGY

## 2021-08-28 PROCEDURE — 6370000000 HC RX 637 (ALT 250 FOR IP): Performed by: INTERNAL MEDICINE

## 2021-08-28 PROCEDURE — 97110 THERAPEUTIC EXERCISES: CPT

## 2021-08-28 PROCEDURE — 2060000000 HC ICU INTERMEDIATE R&B

## 2021-08-28 RX ORDER — POTASSIUM CHLORIDE 20 MEQ/1
40 TABLET, EXTENDED RELEASE ORAL PRN
Status: DISCONTINUED | OUTPATIENT
Start: 2021-08-28 | End: 2021-08-31 | Stop reason: HOSPADM

## 2021-08-28 RX ORDER — POTASSIUM CHLORIDE 7.45 MG/ML
10 INJECTION INTRAVENOUS PRN
Status: DISCONTINUED | OUTPATIENT
Start: 2021-08-28 | End: 2021-08-31 | Stop reason: HOSPADM

## 2021-08-28 RX ADMIN — POTASSIUM CHLORIDE 10 MEQ: 10 INJECTION, SOLUTION INTRAVENOUS at 00:21

## 2021-08-28 RX ADMIN — Medication 100 MG: at 08:58

## 2021-08-28 RX ADMIN — LORAZEPAM 2 MG: 2 INJECTION INTRAMUSCULAR; INTRAVENOUS at 11:48

## 2021-08-28 RX ADMIN — MAGNESIUM GLUCONATE 500 MG ORAL TABLET 400 MG: 500 TABLET ORAL at 08:57

## 2021-08-28 RX ADMIN — POTASSIUM CHLORIDE 10 MEQ: 10 INJECTION, SOLUTION INTRAVENOUS at 02:06

## 2021-08-28 RX ADMIN — POTASSIUM CHLORIDE 10 MEQ: 10 INJECTION, SOLUTION INTRAVENOUS at 04:09

## 2021-08-28 RX ADMIN — HYDROCHLOROTHIAZIDE 25 MG: 25 TABLET ORAL at 08:58

## 2021-08-28 RX ADMIN — TAMSULOSIN HYDROCHLORIDE 0.4 MG: 0.4 CAPSULE ORAL at 08:58

## 2021-08-28 RX ADMIN — LORAZEPAM 2 MG: 2 INJECTION INTRAMUSCULAR; INTRAVENOUS at 02:04

## 2021-08-28 RX ADMIN — LORAZEPAM 2 MG: 2 INJECTION INTRAMUSCULAR; INTRAVENOUS at 15:17

## 2021-08-28 RX ADMIN — FOLIC ACID 1 MG: 1 TABLET ORAL at 08:57

## 2021-08-28 RX ADMIN — MAGNESIUM GLUCONATE 500 MG ORAL TABLET 400 MG: 500 TABLET ORAL at 20:08

## 2021-08-28 RX ADMIN — SODIUM CHLORIDE, PRESERVATIVE FREE 10 ML: 5 INJECTION INTRAVENOUS at 22:12

## 2021-08-28 RX ADMIN — LORAZEPAM 2 MG: 2 INJECTION INTRAMUSCULAR; INTRAVENOUS at 18:11

## 2021-08-28 RX ADMIN — LORAZEPAM 2 MG: 2 INJECTION INTRAMUSCULAR; INTRAVENOUS at 20:08

## 2021-08-28 RX ADMIN — POTASSIUM CHLORIDE 40 MEQ: 20 TABLET, EXTENDED RELEASE ORAL at 08:57

## 2021-08-28 RX ADMIN — AMLODIPINE BESYLATE 10 MG: 10 TABLET ORAL at 08:58

## 2021-08-28 RX ADMIN — LORAZEPAM 2 MG: 2 INJECTION INTRAMUSCULAR; INTRAVENOUS at 22:12

## 2021-08-28 RX ADMIN — SODIUM CHLORIDE, PRESERVATIVE FREE 10 ML: 5 INJECTION INTRAVENOUS at 08:58

## 2021-08-28 RX ADMIN — LORAZEPAM 2 MG: 2 INJECTION INTRAMUSCULAR; INTRAVENOUS at 04:07

## 2021-08-28 RX ADMIN — CARVEDILOL 3.12 MG: 3.12 TABLET, FILM COATED ORAL at 17:44

## 2021-08-28 RX ADMIN — CARVEDILOL 3.12 MG: 3.12 TABLET, FILM COATED ORAL at 08:58

## 2021-08-28 RX ADMIN — POTASSIUM CHLORIDE 10 MEQ: 10 INJECTION, SOLUTION INTRAVENOUS at 05:58

## 2021-08-28 RX ADMIN — SODIUM CHLORIDE, PRESERVATIVE FREE 10 ML: 5 INJECTION INTRAVENOUS at 20:08

## 2021-08-28 RX ADMIN — LORAZEPAM 2 MG: 2 INJECTION INTRAMUSCULAR; INTRAVENOUS at 08:58

## 2021-08-28 ASSESSMENT — PAIN SCALES - GENERAL: PAINLEVEL_OUTOF10: 0

## 2021-08-28 NOTE — PROGRESS NOTES
[facility]  Office: [address], DO, [place] Dyer, DO, Garfield Eckert, DO, Telma Delgado Blood, DO, Raffy Guzman MD, Vern Marinelli MD, Loren Mariano MD, Bryce Pandya MD, Evette Gomez MD, Margy Simms MD, Heather Manrique MD, Joseph Chase, DO, Krupa Nair MD, Devang Hoff, DO, Saravanan Fox MD,  Soledad Garcia, DO, Tonja Quinn MD, Gildardo Roberts MD, Genie Dakin, MD, Krystle Castrejon MD, Bharati Hummel MD, Harmony Roldan MD, Onel Burton MD, Isaias Finley, [facility], [facility], CNP, Jina Woodsr, CNP, Jesus Parada, CNS, Demi Rowe, CNP, Ana Cristina Johnson, CNP, María Tristan, CNP, Waldemar Webber, CNP, Maicol Serna, CNP, DARRICK BellC, Isreal Ervin, [facility], Domingo Harper, CNP, Jacki Melo, CNP, Yanet Robles, CNP, Ricki Graff, CNP, Hamilton Yoo, CNP, Lucille Alejandra, CNP, Manuel Lowery, [facility], [facility]    Progress Note    8/28/2021    8:22 AM    Name:   Bernadette Maria  MRN:     [id number]     [place]:      [de-identified]   Room:   [address] [place] [address] Day:  2  Admit Date:  8/26/2021  9:37 AM    PCP:   Ruben Hanson MD  Code Status:  No Order    Subjective:     C/C:   Chief Complaint   Patient presents with    Seizures     witnessed fall onto the grass via co-workers. Pt fell and struck back of head- abrasion to scalp. Pt LS 5  cleared c spine on scene pt denies neck pain      Interval History Status: improved. No further seizures  Denies cp/sob/n/v    Impulsive per rn, gets up oob frequently despite telesitter    Brief History:     Bernadette Maria is a 63 y.o.  male who presents with Seizures (witnessed fall onto the grass via co-workers. Pt fell and struck back of head- abrasion to scalp. Pt LS 5  cleared c spine on scene pt denies neck pain )   and is admitted to the hospital for the management of New onset seizure ([facility] [address]    This 63 yom presents after having a seizure.   He was at work at time, was heard to have grunted, then fall, tried to get up and fell again. He had no aura and has never had this before. In ER, he was seen to have an approx 1 minute seizure witnessed by staff. His last alcoholic drink was yesterday, per wife in last 6 weeks he went from drinking on weekends to daily 1-2 pints of vodka/day    Review of Systems:     Constitutional:  negative for chills, fevers, sweats  Respiratory:  negative for cough, dyspnea on exertion, shortness of breath, wheezing  Cardiovascular:  negative for chest pain, chest pressure/discomfort, lower extremity edema, palpitations  Gastrointestinal:  negative for abdominal pain, constipation, diarrhea, nausea, vomiting  Neurological:  negative for dizziness, headache    Medications: Allergies: Allergies   Allergen Reactions    Dilaudid [Hydromorphone Hcl]     Morphine     Singulair [Montelukast] Hives    Sulfa Antibiotics        Current Meds:   Scheduled Meds:    sodium chloride flush  10 mL IntraVENous BID    amLODIPine  10 mg Oral Daily    carvedilol  3.125 mg Oral BID WC    hydroCHLOROthiazide  25 mg Oral Daily    magnesium oxide  400 mg Oral BID    thiamine mononitrate  100 mg Oral Daily    folic acid  1 mg Oral Daily    tamsulosin  0.4 mg Oral Daily     Continuous Infusions:   PRN Meds: potassium chloride, LORazepam, hydrALAZINE, acetaminophen    Data:     Past Medical History:   has a past medical history of Hypertension, Prostate cancer ([address] and Smoker. Social History:   reports that he has been smoking. He has been smoking about 1.00 pack per day. He has never used smokeless tobacco. He reports current alcohol use. He reports that he does not use drugs.      Family History:   Family History   Problem Relation Age of Onset    Colon Cancer Maternal Cousin     Colon Cancer Maternal Aunt        Vitals:  BP (!) 142/91   Pulse 79   Temp 98.1 °F (36.7 °C) (Oral)   Resp 16   Ht 5' 10\" (1.778 m)   Wt 203 lb 7 oz (92.3 kg)   SpO2 95%   BMI 29.19 kg/m²   Temp (24hrs), Av.5 °F (36.9 °C), Min:98.1 °F (36.7 °C), Max:98.8 °F (37.1 °C)    Recent Labs     21  1306   POCGLU 134*       I/O (24Hr): Intake/Output Summary (Last 24 hours) at 2021 5955  Last data filed at 2021 0602  Gross per 24 hour   Intake 1626 ml   Output 650 ml   Net 976 ml       Labs:  Hematology:  Recent Labs     21  0940 21  0515 21  0530   WBC 9.2 7.1 8.5   RBC 4.27* 4.02* 4.15*   HGB 14.5 13.2 13.7   HCT 42.3 39.8* 41.5   MCV 99.1 99.0 100.0   MCH 34.1* 32.8 33.0   MCHC 34.4 33.2 33.0   RDW 14.5 14.5* 14.3    180 170   MPV 9.1 10.3 10.4     Chemistry:  Recent Labs     21  0940 21  0940 21  1134 21  0515 21  2324 21  0530     --   --  137  --  136   K 3.7   < >  --  2.8* 3.2* 3.2*     --   --  100  --  100   CO2 19*  --   --  25  --  27   GLUCOSE 126*  --   --  93  --  118*   BUN 10  --   --  5*  --  4*   CREATININE 0.80  --   --  0.49*  --  0.52*   MG 1.6  --   --  1.6  --  1.8   ANIONGAP 18*  --   --  12  --  9   LABGLOM >60  --   --  >60  --  >60   GFRAA >60  --   --  >60  --  >60   CALCIUM 8.8  --   --  8.2*  --  9.0   TROPHS <6  --  <6  --   --   --     < > = values in this interval not displayed. Recent Labs     21  0940 21  1306   PROT 6.4  --    LABALBU 3.9  --    AST 62*  --    ALT 29  --    ALKPHOS 87  --    BILITOT 0.60  --    POCGLU  --  134*     ABG:No results found for: POCPH, PHART, PH, POCPCO2, HPM7HWE, PCO2, POCPO2, PO2ART, PO2, POCHCO3, JPH3SDN, HCO3, NBEA, PBEA, BEART, BE, THGBART, THB, IZW9KJY, URCQ1AAO, R4WLIYAQ, O2SAT, FIO2  No results found for: SPECIAL  No results found for: CULTURE    Radiology:  CT HEAD WO CONTRAST    Result Date: 2021  No acute CT abnormality identified in the brain. No acute osseous abnormality identified in the cervical spine.      CT CERVICAL SPINE WO CONTRAST    Result Date: 2021  No acute CT

## 2021-08-28 NOTE — PROGRESS NOTES
The patient is impulsive and getting out of bed without calling for assistance first.  The bad alarm has activated multiple times to alert staff. The patient is confused this morning and not knowing where he is. Reoriented. The patient is not having any tremors this morning. Close observation maintained.

## 2021-08-28 NOTE — PROGRESS NOTES
Physical Therapy  Facility/Department: [facility] PROGRESSIVE CARE  Daily Treatment Note  NAME: Heaven August  : 1955  MRN: [id number]    Date of Service: 2021    Discharge Recommendations:           Assessment   Body structures, Functions, Activity limitations: (P) Decreased functional mobility ; Decreased safe awareness;Decreased balance;Decreased coordination;Decreased endurance;Decreased strength  Assessment: (P) Skilled therapy needed to maximize independence with functional mobility as well as improve endurance, balance, coordination,strength. Patient is at increased risk of falling due to decreased balance and very shaky. Specific instructions for Next Treatment: (P) take roll walker for patient to use  Prognosis: (P) Good  Decision Making: (P) Medium Complexity  Exam: (P) AROM, strength, endurance, balance, mobility,AM-PAC  Clinical Presentation: (P) evolving  REQUIRES PT FOLLOW UP: (P) Yes  Activity Tolerance  Activity Tolerance: (P) Patient limited by cognitive status; Patient limited by fatigue     Patient Diagnosis(es): The primary encounter diagnosis was Closed head injury, initial encounter. Diagnoses of Vasovagal response, Seizure ([address] and Alcohol abuse were also pertinent to this visit. has a past medical history of Hypertension, Prostate cancer ([address] and Smoker. has a past surgical history that includes joint replacement and Shoulder arthroscopy. Restrictions  Restrictions/Precautions  Restrictions/Precautions: (P) General Precautions, Fall Risk, Up as Tolerated  Required Braces or Orthoses?: (P) No  Position Activity Restriction  Other position/activity restrictions: (P) LUE IV, seizure precautions, telemetry, telesitter  Subjective   General  Chart Reviewed: (P) Yes  Response To Previous Treatment: (P) Patient with no complaints from previous session. Family / Caregiver Present: (P) No  Subjective  Subjective: (P) Pt sitting at EOB finishing breakfast. Pt did vomit on tray.  RN notified. Pt reprots feeling sore from not moving alot. General Comment  Comments: (P) RN states patient apppropriate for therapy  Pain Screening  Patient Currently in Pain: (P) No  Vital Signs  Patient Currently in Pain: (P) No  Patient Observation  Observations: (P) Pt sitting at EOB. Orientation  Orientation  Overall Orientation Status: (P) Within Functional Limits  Cognition      Objective   Bed mobility  Scooting: (P) Stand by assistance  Transfers  Sit to Stand: (P) Minimal Assistance  Stand to sit: (P) Minimal Assistance  Comment: (P) Poterior lean upon standing. very unsteady. lacks safety awareness. Ambulation  Ambulation?: (P) Yes  More Ambulation?: (P) No  Ambulation 1  Surface: (P) level tile  Device: (P) No Device  Assistance: (P) Moderate assistance  Quality of Gait: (P) Anterior lean with momentum. No reciprical arm swing. Gait Deviations: (P) Deviated path;Staggers  Distance: (P) 65'  Comments: (P) patient would benefit from use of roll walker due to loss of balance  Stairs/Curb  Stairs?: (P) No     Balance  Sitting - Static: (P) Good  Sitting - Dynamic: (P) Fair;+  Standing - Static: (P) Fair  Standing - Dynamic: (P) Fair;-  Exercises  Comments: (P) Seated B LE x15 reps .                         G-Code     OutComes Score                                                     AM-PAC Score             Goals  Short term goals  Time Frame for Short term goals: (P) 12 visits  Short term goal 1: (P) Independent bed mobility  Short term goal 2: (P) Independent sit<>stand  Short term goal 3: (P) Patient to ambulate 100' with roll walker modified independent  Short term goal 4: (P) Patient to perform 25 minutes ther act to facilitate improving dynamic standing balance    Plan    Plan  Times per week: (P) 1-2x/day for 5-6 days/week  Specific instructions for Next Treatment: (P) take roll walker for patient to use  Current Treatment Recommendations: (P) Strengthening, Transfer Training, Endurance Training, Balance Training, Gait Training, Stair training, Home Exercise Program, Safety Education & Training, Neuromuscular Re-education, Cognitive Reorientation, Patient/Caregiver Education & Training  Safety Devices  Type of devices: (P) All fall risk precautions in place, Gait belt, Nurse notified, Left in bed, Call light within reach, Bed alarm in place     Therapy Time   Individual Concurrent Group Co-treatment   Time In  0915         Time Out  0938         Minutes  [address], [place]

## 2021-08-28 NOTE — PROGRESS NOTES
Transitions of Care Pharmacy Service   Medication Review    The patient's list of current home medications has been reviewed. Source(s) of information: patient (interviewed 8/27), Mariely Powell refill report, Care Everywhere      Please feel free to call me with any questions about this encounter. Thank you.     Rosalino Miranda, [facility]   Transitions of Care Pharmacy Service  Phone:  [phone]  Fax: [phone]      Electronically signed by Rosalino Miranda [facility] on 8/28/2021 at 10:36 AM           Medications Prior to Admission:   tamsulosin (FLOMAX) 0.4 MG capsule, Take 0.4 mg by mouth daily  amLODIPine (NORVASC) 10 MG tablet, Take 10 mg by mouth daily  buPROPion (WELLBUTRIN SR) 150 MG extended release tablet, Take 150 mg by mouth 2 times daily  carvedilol (COREG) 3.125 MG tablet, Take 3.125 mg by mouth 2 times daily (with meals)  hydroCHLOROthiazide (HYDRODIURIL) 25 MG tablet, Take 25 mg by mouth daily

## 2021-08-29 LAB
ANION GAP SERPL CALCULATED.3IONS-SCNC: 11 MMOL/L (ref 9–17)
BUN BLDV-MCNC: 7 MG/DL (ref 8–23)
BUN/CREAT BLD: 13 (ref 9–20)
CALCIUM SERPL-MCNC: 9.4 MG/DL (ref 8.6–10.4)
CHLORIDE BLD-SCNC: 97 MMOL/L (ref 98–107)
CO2: 24 MMOL/L (ref 20–31)
CREAT SERPL-MCNC: 0.53 MG/DL (ref 0.7–1.2)
GFR AFRICAN AMERICAN: >60 ML/MIN
GFR NON-AFRICAN AMERICAN: >60 ML/MIN
GFR SERPL CREATININE-BSD FRML MDRD: ABNORMAL ML/MIN/{1.73_M2}
GFR SERPL CREATININE-BSD FRML MDRD: ABNORMAL ML/MIN/{1.73_M2}
GLUCOSE BLD-MCNC: 105 MG/DL (ref 70–99)
HCT VFR BLD CALC: 42.2 % (ref 40.7–50.3)
HEMOGLOBIN: 13.9 G/DL (ref 13–17)
MAGNESIUM: 1.9 MG/DL (ref 1.6–2.6)
MCH RBC QN AUTO: 33.1 PG (ref 25.2–33.5)
MCHC RBC AUTO-ENTMCNC: 32.9 G/DL (ref 28.4–34.8)
MCV RBC AUTO: 100.5 FL (ref 82.6–102.9)
NRBC AUTOMATED: 0 PER 100 WBC
PDW BLD-RTO: 14.3 % (ref 11.8–14.4)
PLATELET # BLD: 167 K/UL (ref 138–453)
PMV BLD AUTO: 10 FL (ref 8.1–13.5)
POTASSIUM SERPL-SCNC: 3.7 MMOL/L (ref 3.7–5.3)
POTASSIUM SERPL-SCNC: 4.3 MMOL/L (ref 3.7–5.3)
RBC # BLD: 4.2 M/UL (ref 4.21–5.77)
SODIUM BLD-SCNC: 132 MMOL/L (ref 135–144)
TROPONIN INTERP: NORMAL
TROPONIN T: NORMAL NG/ML
TROPONIN, HIGH SENSITIVITY: 6 NG/L (ref 0–22)
WBC # BLD: 7 K/UL (ref 3.5–11.3)

## 2021-08-29 PROCEDURE — 85027 COMPLETE CBC AUTOMATED: CPT

## 2021-08-29 PROCEDURE — 84484 ASSAY OF TROPONIN QUANT: CPT

## 2021-08-29 PROCEDURE — 2060000000 HC ICU INTERMEDIATE R&B

## 2021-08-29 PROCEDURE — 84132 ASSAY OF SERUM POTASSIUM: CPT

## 2021-08-29 PROCEDURE — 6360000002 HC RX W HCPCS: Performed by: NURSE PRACTITIONER

## 2021-08-29 PROCEDURE — 2580000003 HC RX 258: Performed by: PSYCHIATRY & NEUROLOGY

## 2021-08-29 PROCEDURE — 6370000000 HC RX 637 (ALT 250 FOR IP): Performed by: NURSE PRACTITIONER

## 2021-08-29 PROCEDURE — 6360000002 HC RX W HCPCS: Performed by: INTERNAL MEDICINE

## 2021-08-29 PROCEDURE — 99232 SBSQ HOSP IP/OBS MODERATE 35: CPT | Performed by: INTERNAL MEDICINE

## 2021-08-29 PROCEDURE — 83735 ASSAY OF MAGNESIUM: CPT

## 2021-08-29 PROCEDURE — 93005 ELECTROCARDIOGRAM TRACING: CPT | Performed by: NURSE PRACTITIONER

## 2021-08-29 PROCEDURE — 6370000000 HC RX 637 (ALT 250 FOR IP): Performed by: INTERNAL MEDICINE

## 2021-08-29 PROCEDURE — 80048 BASIC METABOLIC PNL TOTAL CA: CPT

## 2021-08-29 PROCEDURE — 94761 N-INVAS EAR/PLS OXIMETRY MLT: CPT

## 2021-08-29 PROCEDURE — 36415 COLL VENOUS BLD VENIPUNCTURE: CPT

## 2021-08-29 PROCEDURE — 2580000003 HC RX 258: Performed by: INTERNAL MEDICINE

## 2021-08-29 RX ORDER — NICOTINE 21 MG/24HR
1 PATCH, TRANSDERMAL 24 HOURS TRANSDERMAL DAILY
Status: DISCONTINUED | OUTPATIENT
Start: 2021-08-29 | End: 2021-08-31 | Stop reason: HOSPADM

## 2021-08-29 RX ORDER — LORAZEPAM 1 MG/1
2 TABLET ORAL
Status: DISCONTINUED | OUTPATIENT
Start: 2021-08-29 | End: 2021-08-31 | Stop reason: HOSPADM

## 2021-08-29 RX ORDER — LORAZEPAM 1 MG/1
1 TABLET ORAL
Status: DISCONTINUED | OUTPATIENT
Start: 2021-08-29 | End: 2021-08-31 | Stop reason: HOSPADM

## 2021-08-29 RX ORDER — LORAZEPAM 2 MG/ML
2 INJECTION INTRAMUSCULAR
Status: DISCONTINUED | OUTPATIENT
Start: 2021-08-29 | End: 2021-08-31 | Stop reason: HOSPADM

## 2021-08-29 RX ORDER — LORAZEPAM 2 MG/ML
1 INJECTION INTRAMUSCULAR ONCE
Status: COMPLETED | OUTPATIENT
Start: 2021-08-29 | End: 2021-08-29

## 2021-08-29 RX ORDER — SODIUM CHLORIDE 9 MG/ML
25 INJECTION, SOLUTION INTRAVENOUS PRN
Status: DISCONTINUED | OUTPATIENT
Start: 2021-08-29 | End: 2021-08-31 | Stop reason: HOSPADM

## 2021-08-29 RX ORDER — LORAZEPAM 1 MG/1
4 TABLET ORAL
Status: DISCONTINUED | OUTPATIENT
Start: 2021-08-29 | End: 2021-08-31 | Stop reason: HOSPADM

## 2021-08-29 RX ORDER — LORAZEPAM 2 MG/ML
1 INJECTION INTRAMUSCULAR
Status: DISCONTINUED | OUTPATIENT
Start: 2021-08-29 | End: 2021-08-31 | Stop reason: HOSPADM

## 2021-08-29 RX ORDER — SODIUM CHLORIDE 0.9 % (FLUSH) 0.9 %
5-40 SYRINGE (ML) INJECTION PRN
Status: DISCONTINUED | OUTPATIENT
Start: 2021-08-29 | End: 2021-08-31 | Stop reason: HOSPADM

## 2021-08-29 RX ORDER — LORAZEPAM 2 MG/ML
4 INJECTION INTRAMUSCULAR
Status: DISCONTINUED | OUTPATIENT
Start: 2021-08-29 | End: 2021-08-31 | Stop reason: HOSPADM

## 2021-08-29 RX ORDER — CALCIUM CARBONATE 200(500)MG
500 TABLET,CHEWABLE ORAL 3 TIMES DAILY PRN
Status: DISCONTINUED | OUTPATIENT
Start: 2021-08-29 | End: 2021-08-31 | Stop reason: HOSPADM

## 2021-08-29 RX ORDER — LORAZEPAM 2 MG/ML
3 INJECTION INTRAMUSCULAR
Status: DISCONTINUED | OUTPATIENT
Start: 2021-08-29 | End: 2021-08-31 | Stop reason: HOSPADM

## 2021-08-29 RX ORDER — OLANZAPINE 5 MG/1
5 TABLET ORAL ONCE
Status: COMPLETED | OUTPATIENT
Start: 2021-08-29 | End: 2021-08-29

## 2021-08-29 RX ORDER — LORAZEPAM 1 MG/1
3 TABLET ORAL
Status: DISCONTINUED | OUTPATIENT
Start: 2021-08-29 | End: 2021-08-31 | Stop reason: HOSPADM

## 2021-08-29 RX ORDER — SODIUM CHLORIDE 0.9 % (FLUSH) 0.9 %
5-40 SYRINGE (ML) INJECTION EVERY 12 HOURS SCHEDULED
Status: DISCONTINUED | OUTPATIENT
Start: 2021-08-29 | End: 2021-08-31 | Stop reason: HOSPADM

## 2021-08-29 RX ADMIN — LORAZEPAM 1 MG: 2 INJECTION INTRAMUSCULAR; INTRAVENOUS at 06:14

## 2021-08-29 RX ADMIN — CARVEDILOL 3.12 MG: 3.12 TABLET, FILM COATED ORAL at 09:14

## 2021-08-29 RX ADMIN — LORAZEPAM 2 MG: 2 INJECTION INTRAMUSCULAR; INTRAVENOUS at 00:25

## 2021-08-29 RX ADMIN — SODIUM CHLORIDE, PRESERVATIVE FREE 10 ML: 5 INJECTION INTRAVENOUS at 19:29

## 2021-08-29 RX ADMIN — LORAZEPAM 4 MG: 2 INJECTION INTRAMUSCULAR; INTRAVENOUS at 13:34

## 2021-08-29 RX ADMIN — MAGNESIUM GLUCONATE 500 MG ORAL TABLET 400 MG: 500 TABLET ORAL at 09:15

## 2021-08-29 RX ADMIN — OLANZAPINE 5 MG: 5 TABLET, FILM COATED ORAL at 06:13

## 2021-08-29 RX ADMIN — CARVEDILOL 3.12 MG: 3.12 TABLET, FILM COATED ORAL at 18:11

## 2021-08-29 RX ADMIN — SODIUM CHLORIDE, PRESERVATIVE FREE 10 ML: 5 INJECTION INTRAVENOUS at 09:16

## 2021-08-29 RX ADMIN — TAMSULOSIN HYDROCHLORIDE 0.4 MG: 0.4 CAPSULE ORAL at 09:15

## 2021-08-29 RX ADMIN — LORAZEPAM 2 MG: 2 INJECTION INTRAMUSCULAR; INTRAVENOUS at 09:14

## 2021-08-29 RX ADMIN — LORAZEPAM 3 MG: 2 INJECTION INTRAMUSCULAR; INTRAVENOUS at 11:53

## 2021-08-29 RX ADMIN — LORAZEPAM 4 MG: 2 INJECTION INTRAMUSCULAR; INTRAVENOUS at 21:36

## 2021-08-29 RX ADMIN — LORAZEPAM 4 MG: 2 INJECTION INTRAMUSCULAR; INTRAVENOUS at 18:11

## 2021-08-29 RX ADMIN — AMLODIPINE BESYLATE 10 MG: 10 TABLET ORAL at 09:15

## 2021-08-29 RX ADMIN — Medication 500 MG: at 20:16

## 2021-08-29 RX ADMIN — ACETAMINOPHEN 650 MG: 325 TABLET ORAL at 09:22

## 2021-08-29 RX ADMIN — LORAZEPAM 4 MG: 2 INJECTION INTRAMUSCULAR; INTRAVENOUS at 15:44

## 2021-08-29 RX ADMIN — Medication 100 MG: at 09:15

## 2021-08-29 RX ADMIN — LORAZEPAM 2 MG: 2 INJECTION INTRAMUSCULAR; INTRAVENOUS at 04:26

## 2021-08-29 RX ADMIN — LORAZEPAM 2 MG: 2 INJECTION INTRAMUSCULAR; INTRAVENOUS at 02:28

## 2021-08-29 RX ADMIN — HYDROCHLOROTHIAZIDE 25 MG: 25 TABLET ORAL at 09:15

## 2021-08-29 RX ADMIN — LORAZEPAM 4 MG: 2 INJECTION INTRAMUSCULAR; INTRAVENOUS at 19:28

## 2021-08-29 RX ADMIN — MAGNESIUM GLUCONATE 500 MG ORAL TABLET 400 MG: 500 TABLET ORAL at 19:25

## 2021-08-29 RX ADMIN — FOLIC ACID 1 MG: 1 TABLET ORAL at 09:14

## 2021-08-29 ASSESSMENT — PAIN SCALES - GENERAL
PAINLEVEL_OUTOF10: 6
PAINLEVEL_OUTOF10: 0

## 2021-08-29 ASSESSMENT — PAIN DESCRIPTION - LOCATION: LOCATION: BACK

## 2021-08-29 ASSESSMENT — PAIN DESCRIPTION - PAIN TYPE: TYPE: CHRONIC PAIN

## 2021-08-29 NOTE — PROGRESS NOTES
Asked by the off-site nurse practitioner to evaluate patient. Patient is here for seizure likely due to alcohol withdrawal.  Is receiving Ativan every 2 hours. His last dose was approximately 1-1/2 hours ago. Patient coming combative and threatening staff. 1 additional milligram Ativan has been ordered. We will monitor closely.

## 2021-08-29 NOTE — PROGRESS NOTES
[facility]  Office: [address], DO, Armando Riley, DO, Ashleigh Gibbs, DO, Marsha Gama, DO, Johan Huffman MD, Gigi Bryant MD, Juliana Garces MD, Shirley Smart MD, Jacob Alvarez MD, Romel Leiva MD, Natacha Mcdonald MD, Lisa Blackwell DO, Halima Tapia MD, Frederick Wright DO, Radha Chavez MD,  Willie Valle DO, Gonsalo Lewis MD, Branden Birch MD, Zbigniew Maria MD, Gustavo Oglesby MD, Jaqui Clarke MD, Edenilson Martinez MD, Rebekah Bullock MD, Raúl Brice, [facility], [facility], CNP, Reece Estrada, CNP, Fernando Alejandro, CNS, Sabrina Scott, CNP, Claude Leep, CNP, Anna Bhatt, CNP, Lizbeth Kemp, CNP, Keira Rao, CNP, Ken Nguyễn PA-C, Anuel Lopes, [facility], Ritika Ramon, CNP, Leandra Lopez, CNP, Richard Gay, CNP, Isreal Powell, CNP, Marilee Wang, CNP, Cathy Ziegler, CNP, Delroy Sanchez, [facility], Song Alexander, [facility]    Progress Note    8/29/2021    9:19 AM    Name:   Lila Winslow  MRN:     [id number]     [place]:      [de-identified]   Room:   [address] [place] [address] Day:  3  Admit Date:  8/26/2021  9:37 AM    PCP:   Maxine Law MD  Code Status:  No Order    Subjective:     C/C:   Chief Complaint   Patient presents with    Seizures     witnessed fall onto the grass via co-workers. Pt fell and struck back of head- abrasion to scalp. Pt LS 5  cleared c spine on scene pt denies neck pain      Interval History Status: worsened. No further seizures  Denies cp/sob/n/v    Impulsive per rn, gets up oob frequently despite telesitter    Overnight got more agitated, threatening staff    Brief History:     Lila Winslow is a 63 y.o.  male who presents with Seizures (witnessed fall onto the grass via co-workers. Pt fell and struck back of head- abrasion to scalp. Pt LS 5  cleared c spine on scene pt denies neck pain )   and is admitted to the hospital for the management of New onset seizure ([facility] [address]      This 63 yom presents after having a seizure. He was at work at time, was heard to have grunted, then fall, tried to get up and fell again. He had no aura and has never had this before. In ER, he was seen to have an approx 1 minute seizure witnessed by staff. His last alcoholic drink was yesterday, per wife in last 6 weeks he went from drinking on weekends to daily 1-2 pints of vodka/day    Review of Systems:     Constitutional:  negative for chills, fevers, sweats  Respiratory:  negative for cough, dyspnea on exertion, shortness of breath, wheezing  Cardiovascular:  negative for chest pain, chest pressure/discomfort, lower extremity edema, palpitations  Gastrointestinal:  negative for abdominal pain, constipation, diarrhea, nausea, vomiting  Neurological:  negative for dizziness, headache    Medications: Allergies: Allergies   Allergen Reactions    Dilaudid [Hydromorphone Hcl]     Morphine     Singulair [Montelukast] Hives    Sulfa Antibiotics        Current Meds:   Scheduled Meds:    sodium chloride flush  10 mL IntraVENous BID    amLODIPine  10 mg Oral Daily    carvedilol  3.125 mg Oral BID WC    hydroCHLOROthiazide  25 mg Oral Daily    magnesium oxide  400 mg Oral BID    thiamine mononitrate  100 mg Oral Daily    folic acid  1 mg Oral Daily    tamsulosin  0.4 mg Oral Daily     Continuous Infusions:   PRN Meds: potassium chloride **OR** potassium alternative oral replacement **OR** potassium chloride, LORazepam, hydrALAZINE, acetaminophen    Data:     Past Medical History:   has a past medical history of Hypertension, Prostate cancer ([address] and Smoker. Social History:   reports that he has been smoking. He has been smoking about 1.00 pack per day. He has never used smokeless tobacco. He reports current alcohol use. He reports that he does not use drugs.      Family History:   Family History   Problem Relation Age of Onset    Colon Cancer Maternal Cousin     Colon Cancer Maternal Aunt SPECIAL  No results found for: CULTURE    Radiology:  CT HEAD WO CONTRAST    Result Date: 8/26/2021  No acute CT abnormality identified in the brain. No acute osseous abnormality identified in the cervical spine. CT CERVICAL SPINE WO CONTRAST    Result Date: 8/26/2021  No acute CT abnormality identified in the brain. No acute osseous abnormality identified in the cervical spine. Physical Examination:        General appearance:  alert, cooperative and no distress  Mental Status:  oriented to person, confused, and normal affect  Lungs:  clear to auscultation bilaterally, normal effort  Heart:  regular rate and rhythm, no murmur  Abdomen:  soft, nontender, nondistended, normal bowel sounds, no masses, hepatomegaly, splenomegaly  Extremities:  no edema, redness, tenderness in the calves  Skin:  no gross lesions, rashes, induration  Eating breakfast, extensive neuro exam not done    Assessment:        Hospital Problems         Last Modified POA    * (Principal) New onset seizure ([address] 8/26/2021 Yes    Seizures ([address] 8/26/2021 Yes    Tobacco abuse 8/26/2021 Yes    Alcohol abuse 8/26/2021 Yes    Essential hypertension 8/26/2021 Yes    Alcohol withdrawal ([address] 8/28/2021 Yes          Plan:        1. Takes wellbutrin at home: keep off of this due to seizures  2. Monitor for further seizures-none so far  3. Resumed other home meds  4. D/w neuro: we are recommending no driving for 6 months minimum-must be seizure free during that time  5. Pt/ot  6. Ativan for alcohol withdrawal-increase to ciwa scale as his withdrawal has worsened; if worsens more may need transfer to icu and precedex drip  7.  D/w wife    Andrea Garcia Lanette, DO  8/29/2021  9:19 AM

## 2021-08-29 NOTE — PLAN OF CARE
Problem: Falls - Risk of:  Goal: Will remain free from falls  Description: Will remain free from falls  8/28/2021 2325 by Ml Padilla RN  Outcome: Ongoing     Problem: Falls - Risk of:  Goal: Absence of physical injury  Description: Absence of physical injury  8/28/2021 2325 by Ml Padilla RN  Outcome: Ongoing     Problem: Skin Integrity:  Goal: Will show no infection signs and symptoms  Description: Will show no infection signs and symptoms  8/28/2021 2325 by Ml Padilla RN  Outcome: Ongoing     Problem: Skin Integrity:  Goal: Absence of new skin breakdown  Description: Absence of new skin breakdown  8/28/2021 2325 by Ml Padilla RN  Outcome: Ongoing

## 2021-08-29 NOTE — PROGRESS NOTES
The patient is increasingly agitated and aggressive. Reorientation not successful. The patient continues to get out of bed without calling for assistance. Bed alarm activated. The patient states \"I will break your knees. \"  Call placed to security. Call placed to his wife who states she will come sit with the patient. Message sent to the CNP on call to notify of the situation.

## 2021-08-29 NOTE — PLAN OF CARE
Problem: Falls - Risk of:  Goal: Will remain free from falls. Safety sitter and tele sitter in place. Description: Will remain free from falls  Outcome: Ongoing     Problem: Falls - Risk of:  Goal: Absence of physical injury. Bed alarm in place. Description: Absence of physical injury  Outcome: Ongoing     Problem: Pain:  Goal: Control of acute pain. Description: Control of acute pain  Outcome: Ongoing     Problem: Fluid Volume - Deficit:  Goal: Absence of fluid volume deficit signs and symptoms. Description: Absence of fluid volume deficit signs and symptoms  Outcome: Ongoing     Problem: Sleep Pattern Disturbance:  Goal: Appears well-rested. Rest encouraged.   Description: Appears well-rested  Outcome: Ongoing

## 2021-08-30 LAB
ANION GAP SERPL CALCULATED.3IONS-SCNC: 10 MMOL/L (ref 9–17)
BUN BLDV-MCNC: 9 MG/DL (ref 8–23)
BUN/CREAT BLD: 13 (ref 9–20)
CALCIUM SERPL-MCNC: 10 MG/DL (ref 8.6–10.4)
CHLORIDE BLD-SCNC: 102 MMOL/L (ref 98–107)
CO2: 28 MMOL/L (ref 20–31)
CREAT SERPL-MCNC: 0.71 MG/DL (ref 0.7–1.2)
GFR AFRICAN AMERICAN: >60 ML/MIN
GFR NON-AFRICAN AMERICAN: >60 ML/MIN
GFR SERPL CREATININE-BSD FRML MDRD: ABNORMAL ML/MIN/{1.73_M2}
GFR SERPL CREATININE-BSD FRML MDRD: ABNORMAL ML/MIN/{1.73_M2}
GLUCOSE BLD-MCNC: 112 MG/DL (ref 70–99)
POTASSIUM SERPL-SCNC: 3.7 MMOL/L (ref 3.7–5.3)
SODIUM BLD-SCNC: 140 MMOL/L (ref 135–144)

## 2021-08-30 PROCEDURE — 6370000000 HC RX 637 (ALT 250 FOR IP): Performed by: INTERNAL MEDICINE

## 2021-08-30 PROCEDURE — 6370000000 HC RX 637 (ALT 250 FOR IP): Performed by: NURSE PRACTITIONER

## 2021-08-30 PROCEDURE — 97112 NEUROMUSCULAR REEDUCATION: CPT

## 2021-08-30 PROCEDURE — 2580000003 HC RX 258: Performed by: INTERNAL MEDICINE

## 2021-08-30 PROCEDURE — 36415 COLL VENOUS BLD VENIPUNCTURE: CPT

## 2021-08-30 PROCEDURE — 6360000002 HC RX W HCPCS: Performed by: INTERNAL MEDICINE

## 2021-08-30 PROCEDURE — 97530 THERAPEUTIC ACTIVITIES: CPT

## 2021-08-30 PROCEDURE — 2060000000 HC ICU INTERMEDIATE R&B

## 2021-08-30 PROCEDURE — 99232 SBSQ HOSP IP/OBS MODERATE 35: CPT | Performed by: INTERNAL MEDICINE

## 2021-08-30 PROCEDURE — 97535 SELF CARE MNGMENT TRAINING: CPT

## 2021-08-30 PROCEDURE — 97116 GAIT TRAINING THERAPY: CPT

## 2021-08-30 PROCEDURE — 80048 BASIC METABOLIC PNL TOTAL CA: CPT

## 2021-08-30 RX ADMIN — MAGNESIUM GLUCONATE 500 MG ORAL TABLET 400 MG: 500 TABLET ORAL at 19:59

## 2021-08-30 RX ADMIN — LORAZEPAM 3 MG: 2 INJECTION INTRAMUSCULAR; INTRAVENOUS at 18:35

## 2021-08-30 RX ADMIN — MAGNESIUM GLUCONATE 500 MG ORAL TABLET 400 MG: 500 TABLET ORAL at 09:53

## 2021-08-30 RX ADMIN — SODIUM CHLORIDE, PRESERVATIVE FREE 10 ML: 5 INJECTION INTRAVENOUS at 09:53

## 2021-08-30 RX ADMIN — AMLODIPINE BESYLATE 10 MG: 10 TABLET ORAL at 09:54

## 2021-08-30 RX ADMIN — CARVEDILOL 3.12 MG: 3.12 TABLET, FILM COATED ORAL at 10:17

## 2021-08-30 RX ADMIN — FOLIC ACID 1 MG: 1 TABLET ORAL at 09:53

## 2021-08-30 RX ADMIN — LORAZEPAM 4 MG: 2 INJECTION INTRAMUSCULAR; INTRAVENOUS at 09:54

## 2021-08-30 RX ADMIN — TAMSULOSIN HYDROCHLORIDE 0.4 MG: 0.4 CAPSULE ORAL at 09:54

## 2021-08-30 RX ADMIN — LORAZEPAM 4 MG: 2 INJECTION INTRAMUSCULAR; INTRAVENOUS at 03:25

## 2021-08-30 RX ADMIN — CARVEDILOL 3.12 MG: 3.12 TABLET, FILM COATED ORAL at 18:35

## 2021-08-30 RX ADMIN — HYDROCHLOROTHIAZIDE 25 MG: 25 TABLET ORAL at 09:53

## 2021-08-30 RX ADMIN — LORAZEPAM 4 MG: 2 INJECTION INTRAMUSCULAR; INTRAVENOUS at 02:22

## 2021-08-30 RX ADMIN — Medication 100 MG: at 09:54

## 2021-08-30 RX ADMIN — SODIUM CHLORIDE, PRESERVATIVE FREE 10 ML: 5 INJECTION INTRAVENOUS at 19:59

## 2021-08-30 NOTE — PROGRESS NOTES
[facility]  Office: [address], DO, Nika Hartman, DO, Rae Mancini, DO, Wally Cary Blood, DO, Lili Bosch MD, Latonia Castro MD, Queen Jaron MD, Babar Brennan MD, Lottie Mendoza MD, Jolene Montaño MD, Mari Flowers MD, Veronica Galaviz, DO, Jesse Valencia MD, Zohra Myers DO, Heber Ley MD,  Jericho Carranza DO, Joaquina Rodrigues MD, Carolynn Shelley MD, Scott Amin MD, Brennan Ford MD, Carlos Adame MD, Isabel Fleming MD, Nathan Madrid MD, Yuniel Olvera, [facility], [facility], CNP, Ginakerline Olson, CNP, David Patterson, CNS, Russel Hameed, CNP, Digna Morrissey, CNP, Nadira Pacheco, CNP, Karey Mccoy, CNP, Kishore Strong, CNP, Milly Stokes PA-C, Autumn Juarez, [facility], Endy Smart, CNP, Jamaica Lazo, CNP, April Martin, [facility], Jose Hampton, CNP, Jason Napier, CNP, Fam Chaudhry, CNP, Darlyn Caruso, CNP, [facility], [address]    Progress Note    8/30/2021    10:42 AM    Name:   Nataliia Ordaz  MRN:     [id number]     Acct:      [de-identified]   Room:   [address] [place] [address] Day:  4  Admit Date:  8/26/2021  9:37 AM    PCP:   Reanna Mckoy MD  Code Status:  Full Code    Subjective:     C/C:   Chief Complaint   Patient presents with    Seizures     witnessed fall onto the grass via co-workers. Pt fell and struck back of head- abrasion to scalp. Pt LS 5  cleared c spine on scene pt denies neck pain      Interval History Status: improved. No further seizures  Denies cp/sob/n/v    Impulsive per rn    Overnight got more agitated, threatening staff    Brief History:     Nataliia Ordaz is a 63 y.o.  male who presents with Seizures (witnessed fall onto the grass via co-workers. Pt fell and struck back of head- abrasion to scalp. Pt LS 5  cleared c spine on scene pt denies neck pain )   and is admitted to the hospital for the management of New onset seizure ([facility] [address]    This 63 yom presents after having a seizure. He was at work at time, was heard to have grunted, then fall, tried to get up and fell again. He had no aura and has never had this before. In ER, he was seen to have an approx 1 minute seizure witnessed by staff. His last alcoholic drink was yesterday, per wife in last 6 weeks he went from drinking on weekends to daily 1-2 pints of vodka/day    Review of Systems:     Constitutional:  negative for chills, fevers, sweats  Respiratory:  negative for cough, dyspnea on exertion, shortness of breath, wheezing  Cardiovascular:  negative for chest pain, chest pressure/discomfort, lower extremity edema, palpitations  Gastrointestinal:  negative for abdominal pain, constipation, diarrhea, nausea, vomiting  Neurological:  negative for dizziness, headache    Medications: Allergies: Allergies   Allergen Reactions    Dilaudid [Hydromorphone Hcl]     Morphine     Singulair [Montelukast] Hives    Sulfa Antibiotics        Current Meds:   Scheduled Meds:    sodium chloride flush  5-40 mL IntraVENous 2 times per day    nicotine  1 patch Transdermal Daily    amLODIPine  10 mg Oral Daily    carvedilol  3.125 mg Oral BID WC    hydroCHLOROthiazide  25 mg Oral Daily    magnesium oxide  400 mg Oral BID    thiamine mononitrate  100 mg Oral Daily    folic acid  1 mg Oral Daily    tamsulosin  0.4 mg Oral Daily     Continuous Infusions:    sodium chloride       PRN Meds: sodium chloride flush, sodium chloride, LORazepam **OR** LORazepam **OR** LORazepam **OR** LORazepam **OR** LORazepam **OR** LORazepam **OR** LORazepam **OR** LORazepam, calcium carbonate, potassium chloride **OR** potassium alternative oral replacement **OR** potassium chloride, hydrALAZINE, acetaminophen    Data:     Past Medical History:   has a past medical history of Hypertension, Prostate cancer ([facility] [address] and Smoker. Social History:   reports that he has been smoking. He has been smoking about 1.00 pack per day.  He has never used smokeless tobacco. He reports current alcohol use. He reports that he does not use drugs. Family History:   Family History   Problem Relation Age of Onset    Colon Cancer Maternal Cousin     Colon Cancer Maternal Aunt        Vitals:  BP (!) 145/99   Pulse 82   Temp 97.9 °F (36.6 °C) (Oral)   Resp 16   Ht 5' 10\" (1.778 m)   Wt 203 lb 3.2 oz (92.2 kg)   SpO2 95%   BMI 29.16 kg/m²   Temp (24hrs), Av.1 °F (36.7 °C), Min:97.9 °F (36.6 °C), Max:98.2 °F (36.8 °C)    No results for input(s): POCGLU in the last 72 hours. I/O (24Hr): Intake/Output Summary (Last 24 hours) at 2021 1042  Last data filed at 2021 1640  Gross per 24 hour   Intake 240 ml   Output 500 ml   Net -260 ml       Labs:  Hematology:  Recent Labs     21   WBC 8.5 7.0   RBC 4.15* 4.20*   HGB 13.7 13.9   HCT 41.5 42.2   .0 100.5   MCH 33.0 33.1   MCHC 33.0 32.9   RDW 14.3 14.3    167   MPV 10.4 10.0     Chemistry:  Recent Labs     21  0530 21  0712 21  0520     --  132*  --  140   K 3.2*   < > 3.7 4.3 3.7     --  97*  --  102   CO2 27  --  24  --  28   GLUCOSE 118*  --  105*  --  112*   BUN 4*  --  7*  --  9   CREATININE 0.52*  --  0.53*  --  0.71   MG 1.8  --   --  1.9  --    ANIONGAP 9  --  11  --  10   LABGLOM >60  --  >60  --  >60   GFRAA >60  --  >60  --  >60   CALCIUM 9.0  --  9.4  --  10.0   TROPHS  --   --   --  6  --     < > = values in this interval not displayed. No results for input(s): PROT, LABALBU, LABA1C, R9QEWPV, Z7LBLVW, FT4, TSH, AST, ALT, LDH, GGT, ALKPHOS, LABGGT, BILITOT, BILIDIR, AMMONIA, AMYLASE, LIPASE, LACTATE, CHOL, HDL, LDLCHOLESTEROL, CHOLHDLRATIO, TRIG, VLDL, JGZ18KI, PHENYTOIN, PHENYF, URICACID, POCGLU in the last 72 hours.   ABG:No results found for: POCPH, PHART, PH, POCPCO2, HJU6PHC, PCO2, POCPO2, PO2ART, PO2, POCHCO3, XSZ5UKF, HCO3, NBEA, PBEA, BEART, BE, THGBART, THB, JND2CFF, FREH7WQW, D9QNFOYA, O2SAT, FIO2  No results found for: SPECIAL  No results found for: CULTURE    Radiology:  CT HEAD WO CONTRAST    Result Date: 8/26/2021  No acute CT abnormality identified in the brain. No acute osseous abnormality identified in the cervical spine. CT CERVICAL SPINE WO CONTRAST    Result Date: 8/26/2021  No acute CT abnormality identified in the brain. No acute osseous abnormality identified in the cervical spine. Physical Examination:        General appearance:  alert, cooperative and no distress  Mental Status:  oriented to person, confused, and normal affect  Lungs:  clear to auscultation bilaterally, normal effort  Heart:  regular rate and rhythm, no murmur  Abdomen:  soft, nontender, nondistended, normal bowel sounds, no masses, hepatomegaly, splenomegaly  Extremities:  no edema, redness, tenderness in the calves  Skin:  no gross lesions, rashes, induration  Reduced dysmetria  Still +Romberg    Assessment:        Hospital Problems         Last Modified POA    * (Principal) New onset seizure ([address] 8/26/2021 Yes    Seizures ([address] 8/26/2021 Yes    Tobacco abuse 8/26/2021 Yes    Alcohol abuse 8/26/2021 Yes    Essential hypertension 8/26/2021 Yes    Alcohol withdrawal ([address] 8/28/2021 Yes          Plan:        1. Takes wellbutrin at home: keep off of this due to seizures  2. Monitor for further seizures-none so far  3. Cont ciwa scale for alcohol withdrawal: better today but not ready for discharge yet  4. Neuro recommending no driving for 6 months minimum-must be seizure free during that time  5.  D/w wife    Versa Severin Blood, DO  8/30/2021  10:42 AM

## 2021-08-30 NOTE — PLAN OF CARE
Problem: Falls - Risk of:  Goal: Will remain free from falls  Description: Will remain free from falls  8/29/2021 2152 by Rayo Steele RN  Outcome: Ongoing  Note: Pt fall risk, fall band present, falling star, safety alarm activated and in use as needed. Hourly rounding performed. Pt encouraged to use call light. See Virginie Avila fall risk assessment. 8/29/2021 1805 by Zachary Bridges RN  Outcome: Ongoing  Goal: Absence of physical injury  Description: Absence of physical injury  8/29/2021 2152 by Rayo Steele RN  Outcome: Ongoing  Note: Non-skid socks in place, up with assistance, bed in lowest position, bed exit & alarm as needed, provide toileting every 2 hours an d as needed. 8/29/2021 1805 by Zachary Bridges RN  Outcome: Ongoing     Problem: IP BALANCE  Goal: LTG - patient will maintain standing balance to allow for completion of daily activities  Outcome: Ongoing  Note: Pt able to utilize fine body movements, including, but not limited to dressing without assistance. Goal: BALANCE EDUCATION  Description: Educate patients on maintaining dynamic/static standing/sitting balance, with/without upper extremity support. Outcome: Ongoing  Note: Educated pt regarding reason for admission, treatment, medication. Provided oral and/or written instructions to treatment. Assessed level of verbal understanding of pt and/or family. Problem: Skin Integrity:  Goal: Will show no infection signs and symptoms  Description: Will show no infection signs and symptoms  Outcome: Ongoing  Note: Monitor for signs & symptoms of infection. Utilize standard precautions & proper handwashing. Communicate referral to infection control. Goal: Absence of new skin breakdown  Description: Absence of new skin breakdown  Outcome: Ongoing  Note: Continuing to monitor for skin integrity risks. Patient independent with turning/repositioning. Turning/repositioning encouraged at least once every 2 hrs, and prn basis.  Hygiene care being completed independently per patient; assistance provided when deemed necessary. Problem: Pain:  Goal: Pain level will decrease  Description: Pain level will decrease  Outcome: Ongoing  Note: Monitoring pain with each assessment and prn. SONI 0-10 pain scale utilized. Non-pharmacological measures to be encouraged prior to pharmacological measures. Goal: Control of acute pain  Description: Control of acute pain  8/29/2021 2152 by Sergo Dyson RN  Outcome: Ongoing  8/29/2021 1805 by Adina Rick RN  Outcome: Ongoing  Goal: Control of chronic pain  Description: Control of chronic pain  Outcome: Ongoing     Problem: Discharge Planning:  Goal: Discharged to appropriate level of care  Description: Discharged to appropriate level of care  Outcome: Ongoing  Note: Discharge teaching and instructions for diagnosis/procedure explained with patient using teachback method. Patient voiced understanding regarding prescriptions, follow up appointments, and care of self at home. Problem: Fluid Volume - Deficit:  Goal: Absence of fluid volume deficit signs and symptoms  Description: Absence of fluid volume deficit signs and symptoms  8/29/2021 2152 by Sergo Dyson RN  Outcome: Ongoing  Note: Assessed for signs & symptoms of fluid imbalance and/or fluid loss. Assessed for signs of dehydration. Promoted fluid intake per protocol. IVF administered as ordered. 8/29/2021 1805 by Adina Rick RN  Outcome: Ongoing     Problem: Nutrition Deficit:  Goal: Ability to achieve adequate nutritional intake will improve  Description: Ability to achieve adequate nutritional intake will improve  Outcome: Ongoing  Note: Review diet daily and as needed with pt. Provide supplement nutrition as ordered by physician & educate pt. Review nutritional guidelines with pt.       Problem: Sleep Pattern Disturbance:  Goal: Appears well-rested  Description: Appears well-rested  8/29/2021 2152 by Sergo Dyson RN  Outcome: Ongoing  Note: Assessed for signs & symptoms of sleep deprivation. Provided decreased stimulation, offering earplugs & door closed. Assessed for need for sleeping medication. 8/29/2021 1805 by Jeanette Stevenson RN  Outcome: Ongoing     Problem: Violence - Risk of, Self/Other-Directed:  Goal: Knowledge of developmental care interventions  Description: Absence of violence  Outcome: Ongoing  Note: Assessed for need for non-violent restraints to prevent self-harm from pulling on lines, Carlson catheters, implanted ports, etc.  Assessed pt's ability to accept reorientation & verbal redirection from staff.

## 2021-08-30 NOTE — PROGRESS NOTES
Physical Therapy  Facility/Department: [facility] PROGRESSIVE CARE  Daily Treatment Note  NAME: Sachi Lyn  : 1955  MRN: [id number]    Date of Service: 2021    Discharge Recommendations:  Patient would benefit from continued therapy after discharge     Pt currently functioning below baseline. Would suggest additional therapy at time of discharge to maximize long term outcomes and prevent re-admission. Please refer to AM-PAC score for current level of function. Assessment   Body structures, Functions, Activity limitations: Decreased functional mobility ; Decreased safe awareness;Decreased balance;Decreased coordination;Decreased endurance;Decreased strength  Assessment: Skilled therapy needed to maximize independence with functional mobility as well as improve endurance, balance, coordination,strength. Patient is at increased risk of falling due to decreased balance and very shaky. Prognosis: Good  Clinical Presentation: evolving  PT Education: Goals;PT Role;Plan of Care;General Safety;Transfer Training;Gait Training  REQUIRES PT FOLLOW UP: Yes  Activity Tolerance  Activity Tolerance: Patient limited by cognitive status; Patient limited by fatigue     Patient Diagnosis(es): The primary encounter diagnosis was Closed head injury, initial encounter. Diagnoses of Vasovagal response, Seizure ([address] and Alcohol abuse were also pertinent to this visit. has a past medical history of Hypertension, Prostate cancer ([address] and Smoker. has a past surgical history that includes joint replacement and Shoulder arthroscopy.     Restrictions  Restrictions/Precautions  Restrictions/Precautions: General Precautions, Fall Risk, Up as Tolerated  Required Braces or Orthoses?: No  Position Activity Restriction  Other position/activity restrictions: LUE IV, seizure precautions, telemetry, telesitter  Subjective   General  Chart Reviewed: Yes  Response To Previous Treatment: Patient with no complaints from previous session. Family / Caregiver Present: Yes (Wife present)  Subjective  Subjective: Patient agreeable for PT treatment. General Comment  Comments: RN states patient apppropriate for therapy          Orientation  Orientation  Overall Orientation Status: Within Functional Limits  Cognition   Cognition  Overall Cognitive Status: Exceptions  Arousal/Alertness: Appropriate responses to stimuli  Following Commands: Follows one step commands with repetition; Follows one step commands with increased time  Attention Span: Attends with cues to redirect; Difficulty attending to directions  Memory: Decreased short term memory;Decreased recall of recent events;Decreased recall of precautions  Safety Judgement: Decreased awareness of need for assistance;Decreased awareness of need for safety  Problem Solving: Decreased awareness of errors;Assistance required to identify errors made;Assistance required to correct errors made;Assistance required to generate solutions;Assistance required to implement solutions  Insights: Not aware of deficits  Initiation: Requires cues for all  Sequencing: Requires cues for all  Objective   Bed mobility  Supine to Sit: Contact guard assistance  Sit to Supine: Contact guard assistance  Scooting: Contact guard assistance  Comment: Paitent required VCs for sequencing and to self pacing. HOB elevated and good use of bedrail. Transfers  Sit to Stand: Minimal Assistance  Stand to sit: Minimal Assistance  Bed to Chair: Minimal assistance  Stand Pivot Transfers: Minimal Assistance  Lateral Transfers: Minimal Assistance  Comment: STS patient required extensive VCs and hand over hand assist for correct Hand placement during transfer and then to transition them to RW. Poterior lean upon standing, poor safety awareness, requried RW mgmt assistance < during turns. STS x 5 reps improve understanding of hand placement by end of 5 reps.  Patient stood 1 mins x 5 reps  Ambulation  Ambulation?: Yes  More Ambulation?: No  Ambulation 1  Surface: level tile  Device: Rolling Walker  Assistance: Moderate assistance  Quality of Gait: Anterior lean with momentum. Assist for RW mgmt, unsteady gait, poor safety awareness, VCs to widen CHU with poor carryover, hand over hand assist for inital hand placement on RW. Gait Deviations: Deviated path;Staggers  Distance: 30' x 2  Stairs/Curb  Stairs?: No  Neuromuscular Education  NDT Treatment: Gait ;Sitting;Standing  Neuromuscular Comments: VCs req for proper breathing edilberto (pursed lip breathing) during functional mobility. Tactile and VCs req for postural control during sit<>stands & amb to promote abdominal and erector spinae mm facilitation for increased stability and balance, decreasing kyphosis of the spine. Pt req VCs to correct for anterior translation of femur with squatting in addition to pressing firmly into ground with feet, to promote the appropriate body mechanics for sit<>stand transfers. Balance  Sitting - Static: Good  Sitting - Dynamic: Fair  Standing - Static: Fair  Standing - Dynamic: Fair;-  Exercises  Comments: Seated B LE x15 reps . Access Code: [email or web address]: [employer]/Prepared by: Tao Mady  Exercises    Seated Scapular Retraction - 1 x daily - 7 x weekly - 10 reps - 3 sets    Seated Ankle Pumps - 1 x daily - 7 x weekly - 10 reps - 3 sets    Seated Gluteal Sets - 1 x daily - 7 x weekly - 10 reps - 3 sets    Seated Long Arc Quad - 1 x daily - 7 x weekly - 10 reps - 3 sets    Seated March - 1 x daily - 7 x weekly - 10 reps - 3 sets    Seated Hip Abduction - 1 x daily - 7 x weekly - 10 reps - 3 sets    Seated Pursed Lip Breathing - 1 x daily - 7 x weekly - 10 reps - 3 sets  Access Code: [email or web address]: [employer]. [employer]/  Prepared by: Tao Mady  Exercises   Supine Ankle Pumps - 1 x daily - 7 x weekly - 10 reps - 3 sets   Supine Quad Set - 1 x daily - 7 x weekly - 10 reps - 3 sets   Supine Heel Slide - 1 x daily - 7 x weekly - 10 reps - 3 sets

## 2021-08-30 NOTE — PROGRESS NOTES
Patient attempts to get out of bed without alerting staff. Patient disoriented to surroundings, but able to be reoriented. Patient unsteady & hand tremors noted. CIWA scale = 23. PRN Ativan given.

## 2021-08-30 NOTE — PROGRESS NOTES
Patient attempts to get out of bed without alerting staff at bedside. Patient is very unsteady on his feet. Tremors noted on bilateral hands. CIWA scale = 20. PRN Ativan 4 mg IVP given. Patient remains on 1:1. Will monitor.

## 2021-08-30 NOTE — PROGRESS NOTES
Occupational Therapy  Facility/Department: [facility]  Daily Treatment Note  NAME: Valentino Neptune  : 1955  MRN: [id number]    Date of Service: 2021    Discharge Recommendations:    Patient would benefit from continued skilled therapy     Pt currently functioning below baseline. Would suggest additional therapy at time of discharge to maximize long term outcomes and prevent re-admission. Please refer to AM-PAC score for current level of function. Assessment   Performance deficits / Impairments: Decreased functional mobility ; Decreased safe awareness;Decreased cognition;Decreased ADL status; Decreased high-level IADLs;Decreased fine motor control;Decreased balance;Decreased coordination    Assessment: Pt would benefit from continued skilled OT services to increase I and safety during functional tasks to return home at prior level of function as able. During session, wife reported patient was given ativan ~30 minutes before starting treatment. Prior to receiving ativan, wife reported doctor was in the see patient and he was able to stand and follow commands and was slightly unsteady but overall did well. Writer explained patient required a lot of assistance this date and would currently require continued therapy. Wife appeared agreeable. Prognosis: Good  OT Education: Transfer Training;OT Role;Plan of Care;Energy Conservation; ADL Adaptive Strategies;Precautions; Family Education  REQUIRES OT FOLLOW UP: Yes  Activity Tolerance  Activity Tolerance: Patient limited by fatigue;Treatment limited secondary to decreased cognition;Treatment limited secondary to medical complications (free text)  Safety Devices  Safety Devices in place: Yes  Type of devices: Nurse notified;Gait belt;Bed alarm in place; Patient at risk for falls; Left in bed;Call light within reach; All fall risk precautions in place         Patient Diagnosis(es): The primary encounter diagnosis was Closed head injury, initial encounter. Diagnoses of Vasovagal response, Seizure ([address] and Alcohol abuse were also pertinent to this visit. has a past medical history of Hypertension, Prostate cancer ([address] and Smoker. has a past surgical history that includes joint replacement and Shoulder arthroscopy. Restrictions  Restrictions/Precautions  Restrictions/Precautions: General Precautions, Fall Risk, Up as Tolerated  Required Braces or Orthoses?: No  Position Activity Restriction  Other position/activity restrictions: LUE IV, seizure precautions, telemetry, telesitter  Subjective   General  Chart Reviewed: Yes  Patient assessed for rehabilitation services?: Yes  Response to previous treatment: Patient with no complaints from previous session  Family / Caregiver Present: Yes (wife)  Subjective  Subjective: \"I feel buzzed. \"  General Comment  Comments: Patient pleasant and agreeable to OT treatment session      Orientation  Orientation  Overall Orientation Status: Impaired  Orientation Level: Oriented to place;Oriented to person;Disoriented to time;Disoriented to situation (Wife explained situation to patient, when writer asked where are you now? Patient replied \"Sitting in front of the sink. \")  Objective    ADL  Grooming: Setup;Minimal assistance; Increased time to complete (Min A for standing balance at sink)        Balance  Sitting Balance: Contact guard assistance    Standing Balance: Maximum assistance (assist level varied between Max-Min A during treatment)  Standing Balance  Time: standing balance ~5 minutes  Activity: initially static standing EOB, patient with heavy posterior lean and unable to correct despite max verbal/tactile cues, after rest break, patient stood with Min A and able to achieve upright posture  Comment: with RW, patient is very unsteady and while completing oral hygiene, patient in forward flexed posture and Ramiro LE's began to shake toward end of task and patient requried rest break before ambulating back to bed    Functional Mobility  Functional - Mobility Device: Rolling Walker  Activity: Other  Assist Level: Maximum assistance  Functional Mobility Comments: Patient required Min A to complete functional mobility from bed>sink in room with verbal cues for environment scanning(pt running into stationary objects), posture, initating RW movement, and sequencing/technique. On the way back from the sink, patient very impulsive and writer instructed patient to stop to the follow simple one step commands to sit EOB safely    Bed mobility  Bridging: Minimal assistance (assist to hold feet flat on bed and patient able to lift hips with tactile cues)  Supine to Sit: Moderate assistance (Mod A for UB movement)  Sit to Supine: Minimal assistance (assist with BLE's)  Scooting: Moderate assistance;Maximal assistance (Min A to move BLE's and BUE's in proper positioning to assist with scooting up toward HOB, Max-Mod A to scoot HOB)    Transfers  Sit to stand: Moderate assistance;Maximum assistance  Stand to sit: Moderate assistance;Maximum assistance  Transfer Comments: Patient's assist level varied throughout treatment. Patient required multiple attempts to complete full sit>stand with verbal/tactile cues for hand placements, controlled sit<>stand, and pacing self to increase overall safety      Cognition  Overall Cognitive Status: Exceptions  Arousal/Alertness: Appropriate responses to stimuli  Following Commands: Follows one step commands with repetition; Follows one step commands with increased time  Attention Span: Attends with cues to redirect; Difficulty attending to directions  Memory: Decreased short term memory;Decreased recall of recent events;Decreased recall of precautions  Safety Judgement: Decreased awareness of need for assistance;Decreased awareness of need for safety  Problem Solving: Decreased awareness of errors;Assistance required to identify errors made;Assistance required to correct errors made;Assistance required to generate solutions;Assistance required to implement solutions  Insights: Not aware of deficits  Initiation: Requires cues for all  Sequencing: Requires cues for all      Plan   Plan  Times per week: 4-5x/wk 1x/day as darnell  Times per day: Daily  Current Treatment Recommendations: Balance Training, Functional Mobility Training, Safety Education & Training, Home Management Training, Self-Care / ADL, Equipment Evaluation, Education, & procurement, Neuromuscular Re-education, Patient/Caregiver Education & Training  AM-PAC Score        AM-PAC Inpatient Daily Activity Raw Score: 15 (08/30/21 1254)  AM-PAC Inpatient ADL T-Scale Score : 34.69 (08/30/21 1254)  ADL Inpatient CMS 0-100% Score: 56.46 (08/30/21 1254)  ADL Inpatient CMS G-Code Modifier : CK (08/30/21 1254)    Goals  Short term goals  Time Frame for Short term goals: By discharge, pt to demo  Short term goal 1: ADL transfers and functional mobility to Mod I with use of AD as needed. Short term goal 2: UB/LB ADLs to Mod I with use of AD/AE as needed. Short term goal 3: toileting to Mod I with use of AD/grab bars as needed. Short term goal 4: I with fall prevention education and EC/WS tech with use of handouts as needed. Short term goal 5: bed mobility to Independent with use of bedrails as needed. Patient Goals   Patient goals : To go home! Therapy Time   Individual Concurrent Group Co-treatment   Time In 5904         Time Out 8146         Minutes 47           Upon writer exit, call light within reach, pt retired to bed. All lines intact and patient positioned comfortably. All patient needs addressed prior to ending therapy session. Chart reviewed prior to treatment and patient is agreeable for therapy. RN reports patient is medically stable for therapy treatment this date.       DANIEL Ureña

## 2021-08-30 NOTE — PLAN OF CARE
Problem: Falls - Risk of:  Goal: Will remain free from falls  Description: Will remain free from falls. Call light within reach, bed in lowest position and  at the bedside. Outcome: Ongoing     Problem: Falls - Risk of:  Goal: Absence of physical injury  Description: Absence of physical injury. Outcome: Ongoing     Problem: Skin Integrity:  Goal: Absence of new skin breakdown  Description: Absence of new skin breakdown. Pt encouraged to turn in bed.   Outcome: Ongoing

## 2021-08-31 VITALS
SYSTOLIC BLOOD PRESSURE: 133 MMHG | OXYGEN SATURATION: 96 % | RESPIRATION RATE: 16 BRPM | HEIGHT: 70 IN | TEMPERATURE: 98.2 F | DIASTOLIC BLOOD PRESSURE: 89 MMHG | WEIGHT: 208.3 LBS | BODY MASS INDEX: 29.82 KG/M2 | HEART RATE: 87 BPM

## 2021-08-31 LAB
ANION GAP SERPL CALCULATED.3IONS-SCNC: 9 MMOL/L (ref 9–17)
BUN BLDV-MCNC: 18 MG/DL (ref 8–23)
BUN/CREAT BLD: 25 (ref 9–20)
CALCIUM SERPL-MCNC: 9.4 MG/DL (ref 8.6–10.4)
CHLORIDE BLD-SCNC: 101 MMOL/L (ref 98–107)
CO2: 26 MMOL/L (ref 20–31)
CREAT SERPL-MCNC: 0.72 MG/DL (ref 0.7–1.2)
EKG ATRIAL RATE: 100 BPM
EKG P AXIS: 64 DEGREES
EKG P-R INTERVAL: 178 MS
EKG Q-T INTERVAL: 396 MS
EKG QRS DURATION: 150 MS
EKG QTC CALCULATION (BAZETT): 510 MS
EKG R AXIS: 69 DEGREES
EKG T AXIS: -96 DEGREES
EKG VENTRICULAR RATE: 100 BPM
GFR AFRICAN AMERICAN: >60 ML/MIN
GFR NON-AFRICAN AMERICAN: >60 ML/MIN
GFR SERPL CREATININE-BSD FRML MDRD: ABNORMAL ML/MIN/{1.73_M2}
GFR SERPL CREATININE-BSD FRML MDRD: ABNORMAL ML/MIN/{1.73_M2}
GLUCOSE BLD-MCNC: 110 MG/DL (ref 70–99)
HCT VFR BLD CALC: 40.7 % (ref 40.7–50.3)
HEMOGLOBIN: 13.3 G/DL (ref 13–17)
MCH RBC QN AUTO: 33 PG (ref 25.2–33.5)
MCHC RBC AUTO-ENTMCNC: 32.7 G/DL (ref 28.4–34.8)
MCV RBC AUTO: 101 FL (ref 82.6–102.9)
NRBC AUTOMATED: 0 PER 100 WBC
PDW BLD-RTO: 14.1 % (ref 11.8–14.4)
PLATELET # BLD: 185 K/UL (ref 138–453)
PMV BLD AUTO: 10.1 FL (ref 8.1–13.5)
POTASSIUM SERPL-SCNC: 3.6 MMOL/L (ref 3.7–5.3)
RBC # BLD: 4.03 M/UL (ref 4.21–5.77)
SODIUM BLD-SCNC: 136 MMOL/L (ref 135–144)
WBC # BLD: 7.6 K/UL (ref 3.5–11.3)

## 2021-08-31 PROCEDURE — 6370000000 HC RX 637 (ALT 250 FOR IP): Performed by: INTERNAL MEDICINE

## 2021-08-31 PROCEDURE — 85027 COMPLETE CBC AUTOMATED: CPT

## 2021-08-31 PROCEDURE — 99239 HOSP IP/OBS DSCHRG MGMT >30: CPT | Performed by: STUDENT IN AN ORGANIZED HEALTH CARE EDUCATION/TRAINING PROGRAM

## 2021-08-31 PROCEDURE — 6360000002 HC RX W HCPCS: Performed by: NURSE PRACTITIONER

## 2021-08-31 PROCEDURE — 36415 COLL VENOUS BLD VENIPUNCTURE: CPT

## 2021-08-31 PROCEDURE — 80048 BASIC METABOLIC PNL TOTAL CA: CPT

## 2021-08-31 PROCEDURE — 97530 THERAPEUTIC ACTIVITIES: CPT

## 2021-08-31 PROCEDURE — 6370000000 HC RX 637 (ALT 250 FOR IP): Performed by: NURSE PRACTITIONER

## 2021-08-31 PROCEDURE — 97535 SELF CARE MNGMENT TRAINING: CPT

## 2021-08-31 PROCEDURE — 2580000003 HC RX 258: Performed by: INTERNAL MEDICINE

## 2021-08-31 RX ORDER — FOLIC ACID 1 MG/1
1 TABLET ORAL DAILY
Qty: 30 TABLET | Refills: 3 | Status: SHIPPED | OUTPATIENT
Start: 2021-09-01

## 2021-08-31 RX ORDER — HYDROCHLOROTHIAZIDE 25 MG/1
25 TABLET ORAL DAILY
Qty: 30 TABLET | Refills: 3 | Status: SHIPPED | OUTPATIENT
Start: 2021-08-31

## 2021-08-31 RX ORDER — CARVEDILOL 3.12 MG/1
3.12 TABLET ORAL 2 TIMES DAILY WITH MEALS
Qty: 60 TABLET | Refills: 3 | Status: SHIPPED | OUTPATIENT
Start: 2021-08-31

## 2021-08-31 RX ORDER — THIAMINE MONONITRATE (VIT B1) 100 MG
100 TABLET ORAL DAILY
Qty: 30 TABLET | Refills: 1 | Status: SHIPPED | OUTPATIENT
Start: 2021-09-01

## 2021-08-31 RX ORDER — AMLODIPINE BESYLATE 10 MG/1
10 TABLET ORAL DAILY
Qty: 30 TABLET | Refills: 3 | Status: SHIPPED | OUTPATIENT
Start: 2021-08-31

## 2021-08-31 RX ADMIN — TAMSULOSIN HYDROCHLORIDE 0.4 MG: 0.4 CAPSULE ORAL at 08:51

## 2021-08-31 RX ADMIN — HYDRALAZINE HYDROCHLORIDE 10 MG: 20 INJECTION INTRAMUSCULAR; INTRAVENOUS at 05:18

## 2021-08-31 RX ADMIN — MAGNESIUM GLUCONATE 500 MG ORAL TABLET 400 MG: 500 TABLET ORAL at 08:51

## 2021-08-31 RX ADMIN — FOLIC ACID 1 MG: 1 TABLET ORAL at 08:51

## 2021-08-31 RX ADMIN — Medication 100 MG: at 08:51

## 2021-08-31 RX ADMIN — SODIUM CHLORIDE, PRESERVATIVE FREE 10 ML: 5 INJECTION INTRAVENOUS at 08:52

## 2021-08-31 RX ADMIN — AMLODIPINE BESYLATE 10 MG: 10 TABLET ORAL at 08:51

## 2021-08-31 RX ADMIN — HYDROCHLOROTHIAZIDE 25 MG: 25 TABLET ORAL at 08:52

## 2021-08-31 RX ADMIN — CARVEDILOL 3.12 MG: 3.12 TABLET, FILM COATED ORAL at 16:45

## 2021-08-31 RX ADMIN — CARVEDILOL 3.12 MG: 3.12 TABLET, FILM COATED ORAL at 08:52

## 2021-08-31 NOTE — CARE COORDINATION
Social Work-Met with patient and wife to discuss dc options. Discussed therapy recommendation of short term SNF. She is unsure if he will need SNF. She may want to care for him in the home, but is willing to consider short term SNF. Provided [employer]BS list. The Plan for Transition of Care is related to the following treatment goals: SNF with Pt/OT and skilled nursing. The Patient and/or patient representative wife was provided with a choice of provider and agrees   with the discharge plan. [x] Yes [] No    Freedom of choice list was provided with basic dialogue that supports the patient's individualized plan of care/goals, treatment preferences and shares the quality data associated with the providers. [x] Yes [] No. She is reviewing list, but would like to take patient home. She is concerned that patient's insurance will be terminated if he loses his job. She will call  with choice.  Vasquez Deras

## 2021-08-31 NOTE — PROGRESS NOTES
Sánchez White was evaluated today and a DME order was entered for a wheeled walker with seat because he requires this to successfully complete daily living tasks of personal cares and ambulating. A wheeled walker with seat is necessary due to the patient's unsteady gait, upper body weakness, inability to  and ambulation device, ambulating only short distances by pushing a walker, and the need to sit for a short time before resuming ambulation. These tasks cannot be completed with a lesser ambulation device such as a cane, crutch, or standard walker. The need for this equipment was discussed with the patient and he understands and is in agreement.

## 2021-08-31 NOTE — PROGRESS NOTES
Occupational Therapy  Facility/Department: [facility] PROGRESSIVE CARE  Daily Treatment Note  NAME: Maria M Huntley  : 1955  MRN: [id number]    Date of Service: 2021    Discharge Recommendations:    Pt currently functioning below baseline. Would suggest additional therapy at time of discharge to maximize long term outcomes and prevent re-admission. Please refer to AM-PAC score for current level of function. Assessment   Performance deficits / Impairments: Decreased functional mobility ; Decreased safe awareness;Decreased cognition;Decreased ADL status; Decreased high-level IADLs;Decreased fine motor control;Decreased balance;Decreased coordination  Assessment: RN reported patient has not been given any ativan last night. Patient is now progressing toward goals, patient has improved balance, functional mobility. Pt would benefit from continued skilled OT services to increase I and safety during functional tasks to return home at prior level of function as able. Prognosis: Good  OT Education: Transfer Training;OT Role;Plan of Care;Energy Conservation; ADL Adaptive Strategies;Precautions; Family Education  REQUIRES OT FOLLOW UP: Yes  Activity Tolerance  Activity Tolerance: Patient Tolerated treatment well;Treatment limited secondary to decreased cognition  Safety Devices  Safety Devices in place: Yes  Type of devices: Nurse notified;Gait belt;Patient at risk for falls;Call light within reach; All fall risk precautions in place; Left in chair;Chair alarm in place         Patient Diagnosis(es): The primary encounter diagnosis was Closed head injury, initial encounter. Diagnoses of Vasovagal response, Seizure ([address] and Alcohol abuse were also pertinent to this visit. has a past medical history of Hypertension, Prostate cancer ([address] and Smoker. has a past surgical history that includes joint replacement and Shoulder arthroscopy.     Restrictions  Restrictions/Precautions  Restrictions/Precautions: General mobility  Comment: Patient up in the chair upon arrival    Transfers  Sit to stand: Minimal assistance;Contact guard assistance  Stand to sit: Minimal assistance;Contact guard assistance  Transfer Comments: Patient requires verbal cues for sequencing/technique, controlled sit<>stand, squaring self and AD up to surface prior to sitting. Patient is impulsive but with verbal/tactile cues this improves slightly. Patient completed 10 STS's with proper hand palcements      Cognition  Overall Cognitive Status: Exceptions  Arousal/Alertness: Appropriate responses to stimuli  Following Commands: Follows one step commands with repetition; Follows one step commands with increased time  Attention Span: Attends with cues to redirect  Memory: Decreased short term memory;Decreased recall of recent events;Decreased recall of precautions  Safety Judgement: Decreased awareness of need for assistance;Decreased awareness of need for safety  Problem Solving: Decreased awareness of errors;Assistance required to identify errors made;Assistance required to correct errors made;Assistance required to generate solutions;Assistance required to implement solutions  Insights: Decreased awareness of deficits  Initiation: Requires cues for some  Sequencing: Requires cues for some      Plan   Plan  Times per week: 4-5x/wk 1x/day as darnell  Times per day: Daily  Current Treatment Recommendations: Balance Training, Functional Mobility Training, Safety Education & Training, Home Management Training, Self-Care / ADL, Equipment Evaluation, Education, & procurement, Neuromuscular Re-education, Patient/Caregiver Education & Training    AM-PAC Score        AM-[facility] Inpatient Daily Activity Raw Score: 17 (08/31/21 1010)  AM-PAC Inpatient ADL T-Scale Score : 37.26 (08/31/21 1010)  ADL Inpatient CMS 0-100% Score: 50.11 (08/31/21 1010)  ADL Inpatient CMS G-Code Modifier : CK (08/31/21 1010)    Goals  Short term goals  Time Frame for Short term goals: By discharge, pt to demo  Short term goal 1: ADL transfers and functional mobility to Mod I with use of AD as needed. Short term goal 2: UB/LB ADLs to Mod I with use of AD/AE as needed. Short term goal 3: toileting to Mod I with use of AD/grab bars as needed. Short term goal 4: I with fall prevention education and EC/WS tech with use of handouts as needed. Short term goal 5: bed mobility to Independent with use of bedrails as needed. Patient Goals   Patient goals : To go home! Therapy Time   Individual Concurrent Group Co-treatment   Time In 0913         Time Out 8816         Minutes 23              Upon writer exit, call light within reach, pt retired to chair. All lines intact and patient positioned comfortably. Chair alarm in place. All patient needs addressed prior to ending therapy session. Chart reviewed prior to treatment and patient is agreeable for therapy. RN reports patient is medically stable for therapy treatment this date.     MICHELLE Milligan/TOBIAS

## 2021-08-31 NOTE — PROGRESS NOTES
[facility]  Office: [address], DO, Jones Kras, DO, Criss Raymond, DO, Joann Benjamín Blood, DO, Heaven Niño MD, Vicki Cesar MD, Michelle Davalos MD, Baldomero Chavez MD, Jose Rafael Marr MD, Wyatt Calderon MD, Celia Pérez MD, Meliton Darling, DO, Bryan Roth MD, Patrice Correa, DO, Pranay Kemp MD,  Tye Saldivar, DO, Vincent Gallegos MD, Howard Canela MD, Jethro Small MD, Karl Lora MD, Eusebio Flores MD, Angelina Freedman MD, Shireen Lopez MD, Karla Montoya, [facility], [facility], CNP, Mohinder Buckley, CNP, Linda Moreno, CNS, Johanna Payton, CNP, Dipak Dean, CNP, Elo Smallwood, CNP, Etienne Montenegro, CNP, Stephania Solders, CNP, DARRICK MoranC, Trinidad Fishman, [facility], Verónica Fields, CNP, Jakob Soda, CNP, Donnie Husbands, CNP, Jarvis Sanz, CNP, Francie Garsia, CNP, Colletta Moros, CNP, Rosy Harris, CNP, [facility], [facility]    Progress Note    8/31/2021    2:34 PM    Name:   Maria M Huntley  MRN:     [id number]     [place]:      [de-identified]   Room:   [address] [place] [address] Day:  5  Admit Date:  8/26/2021  9:37 AM    PCP:   Maricel Osullivan MD  Code Status:  Full Code    Subjective:     C/C:   Chief Complaint   Patient presents with    Seizures     witnessed fall onto the grass via co-workers. Pt fell and struck back of head- abrasion to scalp. Pt LS 5  cleared c spine on scene pt denies neck pain      Interval History Status: improved. Patient did well in the morning  No Ativan since last night  Had long conversation with patient regarding need for SNF placement. Patient is hesitant to go for SNF placement. He is worried about not being able to drive for 6 months and potentially losing his job. Patient does have abnormal gait from past with osteoarthritis and was able to take care of himself at home. Decision made to discharge patient to home with home care and physical therapy.     Brief History:   Essie Guillory Merary is a 63 y.o.  male who presents with Seizures (witnessed fall onto the grass via co-workers. Pt fell and struck back of head- abrasion to scalp. Pt LS 5  cleared c spine on scene pt denies neck pain )   and is admitted to the hospital for the management of New onset seizure ([facility] [address]    This 63 yom presents after having a seizure. [facility] was at work at time, was heard to have grunted, then fall, tried to get up and fell again. [facility] had no aura and has never had this before.  In ER, he was seen to have an approx 1 minute seizure witnessed by staff.  His last alcoholic drink was yesterday, per wife in last 6 weeks he went from drinking on weekends to daily 1-2 pints of vodka/day    Review of Systems:     Constitutional:  negative for chills, fevers, sweats  Respiratory:  negative for cough, dyspnea on exertion, shortness of breath, wheezing  Cardiovascular:  negative for chest pain, chest pressure/discomfort, lower extremity edema, palpitations  Gastrointestinal:  negative for abdominal pain, constipation, diarrhea, nausea, vomiting  Neurological:  negative for dizziness, headache    Medications: Allergies:     Allergies   Allergen Reactions    Dilaudid [Hydromorphone Hcl]     Morphine     Singulair [Montelukast] Hives    Sulfa Antibiotics        Current Meds:   Scheduled Meds:    sodium chloride flush  5-40 mL IntraVENous 2 times per day    nicotine  1 patch Transdermal Daily    amLODIPine  10 mg Oral Daily    carvedilol  3.125 mg Oral BID WC    hydroCHLOROthiazide  25 mg Oral Daily    magnesium oxide  400 mg Oral BID    thiamine mononitrate  100 mg Oral Daily    folic acid  1 mg Oral Daily    tamsulosin  0.4 mg Oral Daily     Continuous Infusions:    sodium chloride       PRN Meds: sodium chloride flush, sodium chloride, LORazepam **OR** LORazepam **OR** LORazepam **OR** LORazepam **OR** LORazepam **OR** LORazepam **OR** LORazepam **OR** LORazepam, calcium carbonate, potassium chloride **OR** potassium alternative oral replacement **OR** potassium chloride, hydrALAZINE, acetaminophen    Data:     Past Medical History:   has a past medical history of Hypertension, Prostate cancer ([address] and Smoker. Social History:   reports that he has been smoking. He has been smoking about 1.00 pack per day. He has never used smokeless tobacco. He reports current alcohol use. He reports that he does not use drugs. Family History:   Family History   Problem Relation Age of Onset    Colon Cancer Maternal Cousin     Colon Cancer Maternal Aunt        Vitals:  /85   Pulse 94   Temp 98.4 °F (36.9 °C) (Oral)   Resp 16   Ht 5' 10\" (1.778 m)   Wt 208 lb 4.8 oz (94.5 kg)   SpO2 98%   BMI 29.89 kg/m²   Temp (24hrs), Av.3 °F (36.8 °C), Min:97.7 °F (36.5 °C), Max:98.8 °F (37.1 °C)    No results for input(s): POCGLU in the last 72 hours. I/O (24Hr): No intake or output data in the 24 hours ending 21 1434    Labs:  Hematology:  Recent Labs     21  0721  0527   WBC 7.0 7.6   RBC 4.20* 4.03*   HGB 13.9 13.3   HCT 42.2 40.7   .5 101.0   MCH 33.1 33.0   MCHC 32.9 32.7   RDW 14.3 14.1    185   MPV 10.0 10.1     Chemistry:  Recent Labs     21  0721  0721  0520 21  0527   *  --   --  140 136   K 3.7   < > 4.3 3.7 3.6*   CL 97*  --   --  102 101   CO2 24  --   --  28 26   GLUCOSE 105*  --   --  112* 110*   BUN 7*  --   --  9 18   CREATININE 0.53*  --   --  0.71 0.72   MG  --   --  1.9  --   --    ANIONGAP 11  --   --  10 9   LABGLOM >60  --   --  >60 >60   GFRAA >60  --   --  >60 >60   CALCIUM 9.4  --   --  10.0 9.4   TROPHS  --   --  6  --   --     < > = values in this interval not displayed.    No results for input(s): PROT, LABALBU, LABA1C, O5KFCRO, V9GIFNN, FT4, TSH, AST, ALT, LDH, GGT, ALKPHOS, LABGGT, BILITOT, BILIDIR, AMMONIA, AMYLASE, LIPASE, LACTATE, CHOL, HDL, LDLCHOLESTEROL, CHOLHDLRATIO, TRIG, VLDL, IDZ84WE, PHENYTOIN, PHENYF, URICACID, POCGLU in the last 72 hours. ABG:No results found for: POCPH, PHART, PH, POCPCO2, NOK2GRF, PCO2, POCPO2, PO2ART, PO2, POCHCO3, FUQ0JDU, HCO3, NBEA, PBEA, BEART, BE, THGBART, THB, MHV8MIN, OFDL3JYS, B4XUFWSP, O2SAT, FIO2  No results found for: SPECIAL  No results found for: CULTURE    Radiology:  CT HEAD WO CONTRAST    Result Date: 8/26/2021  No acute CT abnormality identified in the brain. No acute osseous abnormality identified in the cervical spine. CT CERVICAL SPINE WO CONTRAST    Result Date: 8/26/2021  No acute CT abnormality identified in the brain. No acute osseous abnormality identified in the cervical spine. MRI brain with and without contrast    Result Date: 8/27/2021  1. Patient motion partially limits evaluation. 2. No acute intracranial abnormality. No acute infarct. 3. Minimal global parenchymal volume loss. Physical Examination:        General appearance:  alert, cooperative and no distress  Mental Status:  oriented to person, place and time and normal affect  Lungs:  clear to auscultation bilaterally, normal effort  Heart:  regular rate and rhythm, no murmur  Abdomen:  soft, nontender, nondistended, normal bowel sounds, no masses, hepatomegaly, splenomegaly  Extremities:  no edema, redness, tenderness in the calves  Skin:  no gross lesions, rashes, induration  Abnormal gait, reduced dysmetria    Assessment:        Hospital Problems         Last Modified POA    * (Principal) New onset seizure ([address] 8/26/2021 Yes    Seizures ([address] 8/26/2021 Yes    Tobacco abuse 8/26/2021 Yes    Alcohol abuse 8/26/2021 Yes    Essential hypertension 8/26/2021 Yes    Alcohol withdrawal ([address] 8/28/2021 Yes          Plan:        New onset seizure secondary to alcohol withdrawal and Wellbutrin use-discontinue Wellbutrin completely. Patient not in acute withdrawal.  Ready for discharge today. No repeat seizures in the office. 6 months minimum no driving recommended.   Patient wants to go home with physical therapy. Discussed with wife and patient. Reluctant to go to nursing home. Wheeled walker for discharge given. Recommend to quit alcohol and smoking completely. Discussed with , will work on physical therapy.     Jethro Small MD  8/31/2021  2:34 PM

## 2021-08-31 NOTE — DISCHARGE SUMMARY
[facility]  Office: [address], DO, Penny Starks, DO, Enedina Bhatt, DO, Bob Gama, DO, Spencer Boyce MD, Maury Sullivan MD, Philipp Arroyo MD, Slava Lopez MD, Rosy Walker MD, Stephy Lewis MD, Gail North MD, Janak Ni, DO, Martha Suarez MD, Juliana Worley DO, Soumya Cabral MD,  Phan Sanderson DO, Rafael Gibbs MD, Clari Alston MD, Joslyn Blandon MD, Kings Gallardo MD, Karin Olson MD, Jan Phelan MD, Porfirio Ventura MD, Arthur Cohen [facility], [facility], CNP, Mary Zavala, CNP, Rm Mendez, CNS, Mickie Espinoza, CNP, Alek Hussein, [facility], Syl Zepeda, CNP, Daryl Serrano, CNP, Maria Preston, CNP, Ronak Thorne PA-C, Neli Rawls, [facility], Antonio Romero, CNP, Giovani Salinas, CNP, Hiren Lopez, CNP, Bev Donaldson, CNP, Deniz Brown, CNP, Landry Mcrae, CNP, Vaughn Nissen, CNP, Raymond Gonzáles, [facility]    Discharge Summary     Patient ID: Reynaldo Pereyra  :  1955   MRN: [id number]     ACCOUNT:  [de-identified]   Patient's PCP: Ender Lopez MD  Admit Date: 2021   Discharge Date: 2021   Length of Stay: 5  Code Status:  Full Code  Admitting Physician: Liborio Gama, DO  Discharge Physician: Joslyn Blandon MD     Active Discharge Diagnoses:     Hospital Problem Lists:  Principal Problem:    New onset seizure [facility])  Active Problems:    Seizures ([address]    Tobacco abuse    Alcohol abuse    Essential hypertension    Alcohol withdrawal [facility])  Resolved Problems:    * No resolved hospital problems. *      Admission Condition:  fair     Discharged Condition: fair    Hospital Stay:     Hospital Course:  Reynaldo Pereyra is a 63 y.o. male who was admitted for the management of New onset seizure [facility]) , presented to ER with Seizures (witnessed fall onto the grass via co-workers. Pt fell and struck back of head- abrasion to scalp.  Pt LS 5  cleared c spine on scene pt denies neck pain )  This 63 yom presents after having a seizure. [employer] was at work at time, was heard to have grunted, then fall, tried to get up and fell again. [facility] [employer] [facility] had no aura and has never had this before.  In ER, he was seen to have an approx 1 minute seizure witnessed by staff.  His last alcoholic drink was day before, per wife in last 6 weeks he went from drinking on weekends to daily 1-2 pints of vodka/day    Patient had recently quit alcohol and was going through alcohol withdrawals, patient was also started on Wellbutrin in November. Patient has been on and off of Wellbutrin for last few years. Patient had witnessed seizure episode. Discussed with neurology, monitor off antiepileptic medications given reason of recent alcohol withdrawal and Wellbutrin use. No driving for 6 months. Patient had abnormal gait. Worked with physical therapy, recommended SNF placement, however because of insurance issues patient felt comfortable to go home with physical therapy. MRI and CT head did not show any acute abnormality. wellbutrin d/kezia on discharge.       Significant therapeutic interventions: as above    Significant Diagnostic Studies:   Labs / Micro:  CBC:   Lab Results   Component Value Date    WBC 7.6 08/31/2021    RBC 4.03 08/31/2021    HGB 13.3 08/31/2021    HCT 40.7 08/31/2021    .0 08/31/2021    MCH 33.0 08/31/2021    MCHC 32.7 08/31/2021    RDW 14.1 08/31/2021     08/31/2021     BMP:    Lab Results   Component Value Date    GLUCOSE 110 08/31/2021     08/31/2021    K 3.6 08/31/2021     08/31/2021    CO2 26 08/31/2021    ANIONGAP 9 08/31/2021    BUN 18 08/31/2021    CREATININE 0.72 08/31/2021    BUNCRER 25 08/31/2021    CALCIUM 9.4 08/31/2021    LABGLOM >60 08/31/2021    GFRAA >60 08/31/2021    GFR      08/31/2021    GFR NOT REPORTED 08/31/2021     HFP:    Lab Results   Component Value Date    PROT 6.4 08/26/2021     CMP:    Lab Results   Component Value Date    GLUCOSE 110 08/31/2021    NA 136 08/31/2021    K 3.6 08/31/2021     08/31/2021    CO2 26 08/31/2021    BUN 18 08/31/2021    CREATININE 0.72 08/31/2021    ANIONGAP 9 08/31/2021    ALKPHOS 87 08/26/2021    ALT 29 08/26/2021    AST 62 08/26/2021    BILITOT 0.60 08/26/2021    LABALBU 3.9 08/26/2021    ALBUMIN 1.6 08/26/2021    LABGLOM >60 08/31/2021    GFRAA >60 08/31/2021    GFR      08/31/2021    GFR NOT REPORTED 08/31/2021    PROT 6.4 08/26/2021    CALCIUM 9.4 08/31/2021     PT/INR:  No results found for: PTINR, PROTIME, INR     Radiology:  CT HEAD WO CONTRAST    Result Date: 8/26/2021  No acute CT abnormality identified in the brain. No acute osseous abnormality identified in the cervical spine. CT CERVICAL SPINE WO CONTRAST    Result Date: 8/26/2021  No acute CT abnormality identified in the brain. No acute osseous abnormality identified in the cervical spine. MRI brain with and without contrast    Result Date: 8/27/2021  1. Patient motion partially limits evaluation. 2. No acute intracranial abnormality. No acute infarct. 3. Minimal global parenchymal volume loss. Consultations:    Consults:     Final Specialist Recommendations/Findings:   IP CONSULT TO SOCIAL WORK  IP CONSULT TO NEUROLOGY  IP CONSULT TO HOME CARE NEEDS      The patient was seen and examined on day of discharge and this discharge summary is in conjunction with any daily progress note from day of discharge. Discharge plan:     Disposition: Home    Physician Follow Up:     Melissa Mitchell MD  [address]  [phone]    In 1 week      [facility]  [facility] [address] [place] [id number]  In 4 weeks      [facility] [address] [facility]  [phone]      they will provide walker     [address] [place] [address]  [phone]    they will provide home care.      Jabari Rangel Kristyn Todd MD  [address] [facility] [phone]    In 3 days         Requiring Further Evaluation/Follow Up POST HOSPITALIZATION/Incidental Findings: PHYSICAL THERAPY POST DISCHARGE    Diet: regular diet    Activity: As tolerated    Instructions to Patient: D/C wellbutrin  No driving 6 months  Quit alcohol completely    Discharge Medications:      Medication List      START taking these medications    folic acid 1 MG tablet  Commonly known as: FOLVITE  Take 1 tablet by mouth daily  Start taking on: September 1, 2021     vitamin B-1 100 MG tablet  Commonly known as: THIAMINE  Take 1 tablet by mouth daily  Start taking on: September 1, 2021        CONTINUE taking these medications    amLODIPine 10 MG tablet  Commonly known as: NORVASC  Take 1 tablet by mouth daily     carvedilol 3.125 MG tablet  Commonly known as: COREG  Take 1 tablet by mouth 2 times daily (with meals)     hydroCHLOROthiazide 25 MG tablet  Commonly known as: HYDRODIURIL  Take 1 tablet by mouth daily     tamsulosin 0.4 MG capsule  Commonly known as: FLOMAX        STOP taking these medications    buPROPion 150 MG extended release tablet  Commonly known as: WELLBUTRIN SR     buPROPion 300 MG extended release tablet  Commonly known as: WELLBUTRIN XL           Where to Get Your Medications      These medications were sent to [facility] [address], [address] [facility] [phone]  [address] [facility], [address] [place] [address]    Phone: [phone]   · amLODIPine 10 MG tablet  · carvedilol 5.127 MG tablet  · folic acid 1 MG tablet  · hydroCHLOROthiazide 25 MG tablet  · vitamin B-1 100 MG tablet         Discharge Procedure Orders   Basic Metabolic Panel   Standing Status: Future Standing Exp.  Date: 08/31/22       Time Spent on discharge is  39 mins in patient examination, evaluation, counseling as well as medication reconciliation, prescriptions for required medications, discharge plan and follow up.    Electronically signed by   Danielle Plascencia MD  8/31/2021  6:28 PM      Thank you Dr. Cathy Kowalski MD for the opportunity to be involved in this patient's care.

## 2021-08-31 NOTE — DISCHARGE INSTR - COC
Elimination:  Continence:   · Bowel: No  · Bladder: No  Urinary Catheter: None   Colostomy/Ileostomy/Ileal Conduit: No       Date of Last BM: 8/31/2021  No intake or output data in the 24 hours ending 08/31/21 1405  No intake/output data recorded. Safety Concerns: At Risk for Falls    Impairments/Disabilities:      Vision    Nutrition Therapy:  Current Nutrition Therapy:   - Oral Diet:  General    Routes of Feeding: Oral  Liquids: No Restrictions  Daily Fluid Restriction: no  Last Modified Barium Swallow with Video (Video Swallowing Test): not done    Treatments at the Time of Hospital Discharge:   Respiratory Treatments: na   Oxygen Therapy:  is not on home oxygen therapy. Ventilator:    - No ventilator support    Rehab Therapies: Physical Therapy and Occupational Therapy  Weight Bearing Status/Restrictions: No weight bearing restirctions  Other Medical Equipment (for information only, NOT a DME order):  walker  Other Treatments:   Skilled RN assessment  Medication reconciliation       Patient's personal belongings (please select all that are sent with patient):  None    RN SIGNATURE:  Electronically signed by Jumana Mark RN on 8/31/21 at 3:49 PM EDT    CASE MANAGEMENT/SOCIAL WORK SECTION    Inpatient Status Date: 8/26/21    Readmission Risk Assessment Score:  Readmission Risk              Risk of Unplanned Readmission:  9           Discharging to Facility/ Agency   · Name: [facility]   · Phone: [phone]  · Fax: [phone]    Dialysis Facility (if applicable)   · Name:  · Address:  · Dialysis Schedule:  · Phone:  · Fax:    / signature: Electronically signed by Lee Ann Otero RN on 8/31/21 at 2:06 PM EDT    PHYSICIAN SECTION    Prognosis: Fair    Condition at Discharge: Stable    Rehab Potential (if transferring to Rehab):  Fair    Recommended Labs or Other Treatments After Discharge: BMP IN 1 WEEK    Physician Certification: I certify the above information and transfer of Mikayla Alcantar Moshe Piper  is necessary for the continuing treatment of the diagnosis listed and that he requires [address] for greater 30 days.      Update Admission H&P: Changes in H&P as follows - DISCHARGE SUMMAR TO FOLLOW    PHYSICIAN SIGNATURE:  Electronically signed by Rolan Montgomery MD on 8/31/21 at 2:42 PM EDT

## 2021-08-31 NOTE — PROGRESS NOTES
Pt taken out per wheelchair. Patient discharged via private auto with wife   Discharge paperwork and instructions given to patient. Patient  acknowledges understanding. Scripts e-scripted to patients pharmacy for Folic acid, thiamine, flomax, hydrochlorothiazide, coreg and Norvasc   New medications and side effects explained. Directions to make follow up appointment given  Discharge checklist completed   MELVI completed and signed per writer and physician for [facility]     Any questions answered.      Telemetry monitor returned

## 2021-08-31 NOTE — PLAN OF CARE
Self/Other-Directed:  Goal: Knowledge of developmental care interventions  Description: Absence of violence  Outcome: Ongoing  Note: Assessed for need for non-violent restraints to prevent self-harm from pulling on lines, Carlson catheters, implanted ports, etc.  Assessed pt's ability to accept reorientation & verbal redirection from staff.

## 2021-08-31 NOTE — PLAN OF CARE
Problem: Falls - Risk of:  Goal: Will remain free from falls  Description: Will remain free from falls  8/31/2021 1139 by Markos Dumont RN  Outcome: Ongoing  Siderails up x 2  Hourly rounding. Call light in reach. Instructed to call for assist before attempting out of bed. Remains free from falls and accidental injury at this time. Floor free from obstacles, and bed is locked and in lowest position. Adequate lighting provided. Bed alarm on. Fall sticker on wristband. Fall Sign posted in doorway       Problem: Falls - Risk of:  Goal: Absence of physical injury  Description: Absence of physical injury  8/31/2021 1139 by Markos Dumont RN  Outcome: Ongoing     Problem: IP BALANCE  Goal: LTG - patient will maintain standing balance to allow for completion of daily activities  8/31/2021 1139 by Markos Dumont RN  Outcome: Ongoing     Problem: IP BALANCE  Goal: BALANCE EDUCATION  Description: Educate patients on maintaining dynamic/static standing/sitting balance, with/without upper extremity support.   8/31/2021 1139 by Markos Dumont RN  Outcome: Ongoing     Problem: Skin Integrity:  Goal: Will show no infection signs and symptoms  Description: Will show no infection signs and symptoms  8/31/2021 1139 by Markos Dumont RN  Outcome: Ongoing     Problem: Skin Integrity:  Goal: Absence of new skin breakdown  Description: Absence of new skin breakdown  8/31/2021 1139 by Markos Dumont RN  Outcome: Ongoing     Problem: Pain:  Goal: Pain level will decrease  Description: Pain level will decrease  8/31/2021 1139 by Markos Dumont RN  Outcome: Ongoing     Problem: Pain:  Goal: Control of acute pain  Description: Control of acute pain  8/31/2021 1139 by Markos Dumont RN  Outcome: Ongoing     Problem: Pain:  Goal: Control of chronic pain  Description: Control of chronic pain  8/31/2021 1139 by Markos Dumont RN  Outcome: Ongoing     Problem: Discharge Planning:  Goal: Discharged to appropriate level of care  Description: Discharged to appropriate level of care  8/31/2021 1139 by Jakob Kinsey RN  Outcome: Ongoing     Problem: Fluid Volume - Deficit:  Goal: Absence of fluid volume deficit signs and symptoms  Description: Absence of fluid volume deficit signs and symptoms  8/31/2021 1139 by Jakob Kinsey RN  Outcome: Ongoing     Problem: Nutrition Deficit:  Goal: Ability to achieve adequate nutritional intake will improve  Description: Ability to achieve adequate nutritional intake will improve  8/31/2021 1139 by Jakob Kinsey RN  Outcome: Ongoing  Patient able to feed himself, adequate intake      Problem: Sleep Pattern Disturbance:  Goal: Appears well-rested  Description: Appears well-rested  8/31/2021 1139 by Jakob Kinsey RN  Outcome: Ongoing     Problem: Violence - Risk of, Self/Other-Directed:  Goal: Knowledge of developmental care interventions  Description: Absence of violence  8/31/2021 1139 by Jakob Kinsey RN  Outcome: Ongoing

## 2025-01-17 LAB — PROSTATE SPECIFIC ANTIGEN: 4.4 NG/ML (ref 0.4–4)
